# Patient Record
Sex: FEMALE | Race: WHITE
[De-identification: names, ages, dates, MRNs, and addresses within clinical notes are randomized per-mention and may not be internally consistent; named-entity substitution may affect disease eponyms.]

---

## 2019-10-24 ENCOUNTER — HOSPITAL ENCOUNTER (EMERGENCY)
Dept: HOSPITAL 53 - M ED | Age: 79
Discharge: HOME | End: 2019-10-24
Payer: MEDICARE

## 2019-10-24 VITALS — DIASTOLIC BLOOD PRESSURE: 74 MMHG | SYSTOLIC BLOOD PRESSURE: 170 MMHG

## 2019-10-24 VITALS — WEIGHT: 230.49 LBS | BODY MASS INDEX: 42.42 KG/M2 | HEIGHT: 62 IN

## 2019-10-24 DIAGNOSIS — S20.212A: ICD-10-CM

## 2019-10-24 DIAGNOSIS — Z79.890: ICD-10-CM

## 2019-10-24 DIAGNOSIS — S01.81XA: Primary | ICD-10-CM

## 2019-10-24 DIAGNOSIS — I10: ICD-10-CM

## 2019-10-24 DIAGNOSIS — E11.9: ICD-10-CM
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
## 2019-10-24 DIAGNOSIS — Z79.899: ICD-10-CM
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
## 2019-10-24 LAB
ALBUMIN SERPL BCG-MCNC: 2.8 GM/DL (ref 3.2–5.2)
ALT SERPL W P-5'-P-CCNC: 22 U/L (ref 12–78)
APTT BLD: 35.9 SECONDS (ref 25–38.4)
BASOPHILS # BLD AUTO: 0 10^3/UL (ref 0–0.2)
BASOPHILS NFR BLD AUTO: 0.6 % (ref 0–1)
BILIRUB CONJ SERPL-MCNC: 0.3 MG/DL (ref 0–0.2)
BILIRUB SERPL-MCNC: 0.9 MG/DL (ref 0.2–1)
BUN SERPL-MCNC: 21 MG/DL (ref 7–18)
CALCIUM SERPL-MCNC: 9.2 MG/DL (ref 8.8–10.2)
CHLORIDE SERPL-SCNC: 107 MEQ/L (ref 98–107)
CO2 SERPL-SCNC: 30 MEQ/L (ref 21–32)
CREAT SERPL-MCNC: 1.19 MG/DL (ref 0.55–1.3)
EOSINOPHIL # BLD AUTO: 0.4 10^3/UL (ref 0–0.5)
EOSINOPHIL NFR BLD AUTO: 5.4 % (ref 0–3)
GFR SERPL CREATININE-BSD FRML MDRD: 46.7 ML/MIN/{1.73_M2} (ref 39–?)
GLUCOSE SERPL-MCNC: 277 MG/DL (ref 70–100)
HCT VFR BLD AUTO: 41.2 % (ref 36–47)
HGB BLD-MCNC: 13.3 G/DL (ref 12–15.5)
INR PPP: 1.1
LIPASE SERPL-CCNC: 692 U/L (ref 73–393)
LYMPHOCYTES # BLD AUTO: 1.8 10^3/UL (ref 1.5–5)
LYMPHOCYTES NFR BLD AUTO: 26.8 % (ref 24–44)
MCH RBC QN AUTO: 31.4 PG (ref 27–33)
MCHC RBC AUTO-ENTMCNC: 32.3 G/DL (ref 32–36.5)
MCV RBC AUTO: 97.2 FL (ref 80–96)
MONOCYTES # BLD AUTO: 0.5 10^3/UL (ref 0–0.8)
MONOCYTES NFR BLD AUTO: 7.2 % (ref 0–5)
NEUTROPHILS # BLD AUTO: 3.9 10^3/UL (ref 1.5–8.5)
NEUTROPHILS NFR BLD AUTO: 59.4 % (ref 36–66)
PLATELET # BLD AUTO: 109 10^3/UL (ref 150–450)
POTASSIUM SERPL-SCNC: 4.9 MEQ/L (ref 3.5–5.1)
PROT SERPL-MCNC: 7.3 GM/DL (ref 6.4–8.2)
PROTHROMBIN TIME: 13.9 SECONDS (ref 11.8–14)
RBC # BLD AUTO: 4.24 10^6/UL (ref 4–5.4)
SODIUM SERPL-SCNC: 142 MEQ/L (ref 136–145)
WBC # BLD AUTO: 6.6 10^3/UL (ref 4–10)

## 2019-10-24 PROCEDURE — 85610 PROTHROMBIN TIME: CPT

## 2019-10-24 PROCEDURE — 71250 CT THORAX DX C-: CPT

## 2019-10-24 PROCEDURE — 86900 BLOOD TYPING SEROLOGIC ABO: CPT

## 2019-10-24 PROCEDURE — 72125 CT NECK SPINE W/O DYE: CPT

## 2019-10-24 PROCEDURE — 85730 THROMBOPLASTIN TIME PARTIAL: CPT

## 2019-10-24 PROCEDURE — 96374 THER/PROPH/DIAG INJ IV PUSH: CPT

## 2019-10-24 PROCEDURE — 83690 ASSAY OF LIPASE: CPT

## 2019-10-24 PROCEDURE — 12011 RPR F/E/E/N/L/M 2.5 CM/<: CPT

## 2019-10-24 PROCEDURE — 94760 N-INVAS EAR/PLS OXIMETRY 1: CPT

## 2019-10-24 PROCEDURE — 74176 CT ABD & PELVIS W/O CONTRAST: CPT

## 2019-10-24 PROCEDURE — 80048 BASIC METABOLIC PNL TOTAL CA: CPT

## 2019-10-24 PROCEDURE — 86901 BLOOD TYPING SEROLOGIC RH(D): CPT

## 2019-10-24 PROCEDURE — 80076 HEPATIC FUNCTION PANEL: CPT

## 2019-10-24 PROCEDURE — 70450 CT HEAD/BRAIN W/O DYE: CPT

## 2019-10-24 PROCEDURE — 85025 COMPLETE CBC W/AUTO DIFF WBC: CPT

## 2019-10-24 PROCEDURE — 93041 RHYTHM ECG TRACING: CPT

## 2019-10-24 PROCEDURE — 86850 RBC ANTIBODY SCREEN: CPT

## 2019-10-24 PROCEDURE — 96375 TX/PRO/DX INJ NEW DRUG ADDON: CPT

## 2019-10-24 PROCEDURE — 99285 EMERGENCY DEPT VISIT HI MDM: CPT

## 2019-10-24 NOTE — REP
CT cervical spine:  10/24/2019.

 

Indication:  Cervical spine trauma.

 

Comparison:  None.

 

Technique:  Unenhanced axial CT images of the cervical spine were performed with

sagittal and coronal reconstructions provided.

 

Findings:

 

There is no acute fracture, subluxation or dislocation.  The spinal canal and

neural foramen appear patent without evidence of spinal canal hemorrhage or

additional acute spinal canal post traumatic sequelae.  The paraspinal soft

tissues are unremarkable.  Multilevel degenerative sequelae are present most

pronounced at C6/C7.

 

Impression:

 

No acute post traumatic osseous injuries of the cervical spine.

 

 

Electronically Signed by

Robbie Li DO 10/24/2019 01:49 P

## 2019-10-24 NOTE — REP
REASON:  Pain after trauma.

 

The lack of intravenous contrast significantly decreases the sensitivity of the

exam particularly when the patient has been involved in trauma.

 

Trauma protocol requires intravenous contrast administration to better evaluate

the liver and spleen for small lacerations.

 

For discussion of the lung bases seen the CT examination of the chest report made

the same day.

 

Grossly, the liver and spleen are within normal limits.  No abnormal perisplenic

or perihepatic fluid is identified.  The pancreas, adrenal glands, and kidneys

are within normal limits for the patient's age.  The abdominal aorta and

paraaortic regions are within normal limits for the patient's age.  There is no

evidence of free fluid or free air in the abdomen.  The bowel loops and their

mesenteries are unremarkable.  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

 

CT PELVIS:

 

There is no free fluid or free air.  There is no mass or adenopathy.  The bowel

loops and their mesenteries are within normal limits.

 

Bone window technique throughout the exam shows the osseous structures to be

within normal limits for the patient's age.

 

IMPRESSION:

 

Exam limitations and findings as described above.  There is no evidence of an

acute abnormality.

 

 

Electronically Signed by

Raymond Paez DO 10/24/2019 04:57 P

## 2019-10-24 NOTE — REP
HISTORY: Pain after trauma.

 

COMPARISON: None.

 

The lack of intravenous contrast decreases the sensitivity of the exam.

 

Standard trauma protocol requires the use of intravenous contrast.

 

There is no evidence of mediastinal or hilar adenopathy. There are no pleural or

pericardial effusions. The imaged osseous structures are within normal limits for

the patient's age.

 

Evaluation of the lung fields shows no abnormal nodules, masses or opacities.

 

IMPRESSION:

 

Limited exam showing no evidence of acute disease.

 

 

Electronically Signed by

Raymond Paez DO 10/24/2019 04:57 P

## 2019-10-24 NOTE — REP
CT brain:  10/24/2019.

 

Indication:  Head trauma.

 

Comparison:  03/08/2010.

 

Technique:  Unenhanced axial CT images of the brain were obtained from skull base

to vertex.

 

Findings:

 

There is no acute intracranial hemorrhage, acute cortical infarction, mass

effect, hydrocephalus or acute calvarial fracture.  Diffuse volume loss is

present.  Patchy areas of cerebral white matter hypoattenuation are noted most

consistent with sequelae of chronic small vessel disease.

 

Impression:

 

No acute intracranial process.

 

Mild volume loss and sequelae of chronic microangiopathic ischemic disease.

 

 

Electronically Signed by

Robbie Li DO 10/24/2019 01:46 P

## 2021-04-07 ENCOUNTER — HOSPITAL ENCOUNTER (EMERGENCY)
Dept: HOSPITAL 53 - M ED | Age: 81
Discharge: HOME | End: 2021-04-07
Payer: MEDICARE

## 2021-04-07 VITALS — DIASTOLIC BLOOD PRESSURE: 91 MMHG | SYSTOLIC BLOOD PRESSURE: 121 MMHG

## 2021-04-07 VITALS — HEIGHT: 62 IN | WEIGHT: 232.81 LBS | BODY MASS INDEX: 42.84 KG/M2

## 2021-04-07 DIAGNOSIS — E11.9: Primary | ICD-10-CM

## 2021-04-07 DIAGNOSIS — E78.5: ICD-10-CM

## 2021-04-07 DIAGNOSIS — I10: ICD-10-CM

## 2021-04-07 DIAGNOSIS — Z79.899: ICD-10-CM

## 2021-04-07 DIAGNOSIS — F41.9: ICD-10-CM

## 2021-04-07 DIAGNOSIS — E66.9: ICD-10-CM

## 2021-04-07 DIAGNOSIS — Z79.4: ICD-10-CM

## 2021-04-07 LAB
BASE EXCESS BLDV CALC-SCNC: -3.2 MMOL/L (ref -2–2)
BASOPHILS # BLD AUTO: 0 10^3/UL (ref 0–0.2)
BASOPHILS NFR BLD AUTO: 0.7 % (ref 0–1)
BUN SERPL-MCNC: 34 MG/DL (ref 7–18)
CALCIUM SERPL-MCNC: 10.1 MG/DL (ref 8.8–10.2)
CHLORIDE SERPL-SCNC: 110 MEQ/L (ref 98–107)
CO2 BLDV CALC-SCNC: 23.8 MEQ/L (ref 24–28)
CO2 SERPL-SCNC: 30 MEQ/L (ref 21–32)
CREAT SERPL-MCNC: 1.66 MG/DL (ref 0.55–1.3)
EOSINOPHIL # BLD AUTO: 0.5 10^3/UL (ref 0–0.5)
EOSINOPHIL NFR BLD AUTO: 8.1 % (ref 0–3)
EST. AVERAGE GLUCOSE BLD GHB EST-MCNC: 192 MG/DL (ref 60–110)
GFR SERPL CREATININE-BSD FRML MDRD: 31.6 ML/MIN/{1.73_M2} (ref 32–?)
GLUCOSE SERPL-MCNC: 157 MG/DL (ref 70–100)
HCO3 BLDV-SCNC: 22.5 MEQ/L (ref 23–27)
HCO3 STD BLDV-SCNC: 21 MEQ/L
HCT VFR BLD AUTO: 38.4 % (ref 36–47)
HGB BLD-MCNC: 12.3 G/DL (ref 12–15.5)
LYMPHOCYTES # BLD AUTO: 2 10^3/UL (ref 1.5–5)
LYMPHOCYTES NFR BLD AUTO: 35.3 % (ref 24–44)
MCH RBC QN AUTO: 31.3 PG (ref 27–33)
MCHC RBC AUTO-ENTMCNC: 32 G/DL (ref 32–36.5)
MCV RBC AUTO: 97.7 FL (ref 80–96)
MONOCYTES # BLD AUTO: 0.6 10^3/UL (ref 0–0.8)
MONOCYTES NFR BLD AUTO: 11.2 % (ref 2–8)
NEUTROPHILS # BLD AUTO: 2.5 10^3/UL (ref 1.5–8.5)
NEUTROPHILS NFR BLD AUTO: 44.3 % (ref 36–66)
PCO2 BLDV: 42.9 MMHG (ref 38–50)
PH BLDV: 7.34 UNITS (ref 7.33–7.43)
PLATELET # BLD AUTO: 85 10^3/UL (ref 150–450)
PO2 BLDV: 33.9 MMHG (ref 30–50)
POTASSIUM SERPL-SCNC: 4 MEQ/L (ref 3.5–5.1)
RBC # BLD AUTO: 3.93 10^6/UL (ref 4–5.4)
SAO2 % BLDV: 59.5 % (ref 60–80)
SODIUM SERPL-SCNC: 142 MEQ/L (ref 136–145)
WBC # BLD AUTO: 5.6 10^3/UL (ref 4–10)

## 2021-04-07 NOTE — REP
INDICATION:

TIA.



COMPARISON:

CT head 10/24/2019.



TECHNIQUE:

Axial CT images with multiplanar reformations.



FINDINGS:

No acute bleed or acute large vessel territorial infarct.  Ventricles, cisterns and

sulci are within normal limits.  No mass effect or midline shift.  No abnormal fluid

collections.



Paranasal sinuses and mastoid air cells are clear.



IMPRESSION:

No acute findings.  No significant changes from previous.





<Electronically signed by Fausto De La Cruz > 04/07/21 [id number]

## 2021-08-03 ENCOUNTER — HOSPITAL ENCOUNTER (INPATIENT)
Dept: HOSPITAL 53 - M ED | Age: 81
LOS: 5 days | DRG: 871 | End: 2021-08-08
Attending: FAMILY MEDICINE | Admitting: FAMILY MEDICINE
Payer: MEDICARE

## 2021-08-03 VITALS — DIASTOLIC BLOOD PRESSURE: 70 MMHG | SYSTOLIC BLOOD PRESSURE: 161 MMHG

## 2021-08-03 VITALS — HEIGHT: 63 IN | WEIGHT: 255.96 LBS | BODY MASS INDEX: 45.35 KG/M2

## 2021-08-03 VITALS — DIASTOLIC BLOOD PRESSURE: 59 MMHG | SYSTOLIC BLOOD PRESSURE: 95 MMHG

## 2021-08-03 VITALS — DIASTOLIC BLOOD PRESSURE: 72 MMHG | SYSTOLIC BLOOD PRESSURE: 158 MMHG

## 2021-08-03 VITALS — SYSTOLIC BLOOD PRESSURE: 112 MMHG | DIASTOLIC BLOOD PRESSURE: 95 MMHG

## 2021-08-03 VITALS — SYSTOLIC BLOOD PRESSURE: 118 MMHG | DIASTOLIC BLOOD PRESSURE: 98 MMHG

## 2021-08-03 VITALS — SYSTOLIC BLOOD PRESSURE: 143 MMHG | DIASTOLIC BLOOD PRESSURE: 77 MMHG

## 2021-08-03 VITALS — DIASTOLIC BLOOD PRESSURE: 65 MMHG | SYSTOLIC BLOOD PRESSURE: 147 MMHG

## 2021-08-03 VITALS — SYSTOLIC BLOOD PRESSURE: 133 MMHG | DIASTOLIC BLOOD PRESSURE: 60 MMHG

## 2021-08-03 DIAGNOSIS — N39.0: ICD-10-CM

## 2021-08-03 DIAGNOSIS — Z79.4: ICD-10-CM

## 2021-08-03 DIAGNOSIS — G93.41: ICD-10-CM

## 2021-08-03 DIAGNOSIS — I76: ICD-10-CM

## 2021-08-03 DIAGNOSIS — E03.9: ICD-10-CM

## 2021-08-03 DIAGNOSIS — E87.2: ICD-10-CM

## 2021-08-03 DIAGNOSIS — Z79.899: ICD-10-CM

## 2021-08-03 DIAGNOSIS — I33.0: ICD-10-CM

## 2021-08-03 DIAGNOSIS — A41.1: Primary | ICD-10-CM

## 2021-08-03 DIAGNOSIS — Z51.5: ICD-10-CM

## 2021-08-03 DIAGNOSIS — I63.59: ICD-10-CM

## 2021-08-03 DIAGNOSIS — B96.20: ICD-10-CM

## 2021-08-03 DIAGNOSIS — Z20.822: ICD-10-CM

## 2021-08-03 DIAGNOSIS — E11.649: ICD-10-CM

## 2021-08-03 DIAGNOSIS — R68.0: ICD-10-CM

## 2021-08-03 DIAGNOSIS — E83.51: ICD-10-CM

## 2021-08-03 DIAGNOSIS — I46.9: ICD-10-CM

## 2021-08-03 DIAGNOSIS — M62.82: ICD-10-CM

## 2021-08-03 DIAGNOSIS — R65.21: ICD-10-CM

## 2021-08-03 DIAGNOSIS — E66.9: ICD-10-CM

## 2021-08-03 DIAGNOSIS — N17.9: ICD-10-CM

## 2021-08-03 DIAGNOSIS — Z66: ICD-10-CM

## 2021-08-03 DIAGNOSIS — I21.A1: ICD-10-CM

## 2021-08-03 LAB
ALBUMIN SERPL BCG-MCNC: 2.2 GM/DL (ref 3.2–5.2)
ALT SERPL W P-5'-P-CCNC: 44 U/L (ref 12–78)
B-OH-BUTYR SERPL-MCNC: 2.85 MG/DL (ref ?–2.81)
BASE EXCESS BLDV CALC-SCNC: -5.5 MMOL/L (ref -2–2)
BILIRUB CONJ SERPL-MCNC: 1.7 MG/DL (ref 0–0.2)
BILIRUB SERPL-MCNC: 2.9 MG/DL (ref 0.2–1)
BUN SERPL-MCNC: 49 MG/DL (ref 7–18)
CALCIUM SERPL-MCNC: 8 MG/DL (ref 8.8–10.2)
CHLORIDE SERPL-SCNC: 107 MEQ/L (ref 98–107)
CK MB CFR.DF SERPL CALC: 3.12
CK MB CFR.DF SERPL CALC: 3.43
CK MB SERPL-MCNC: 64.5 NG/ML (ref ?–3.6)
CK MB SERPL-MCNC: 70.6 NG/ML (ref ?–3.6)
CK SERPL-CCNC: 1879 U/L (ref 26–192)
CO2 BLDV CALC-SCNC: 21.3 MEQ/L (ref 24–28)
CO2 SERPL-SCNC: 21 MEQ/L (ref 21–32)
CREAT SERPL-MCNC: 1.71 MG/DL (ref 0.55–1.3)
EOSINOPHIL NFR BLD MANUAL: 1 % (ref 0–3)
EST. AVERAGE GLUCOSE BLD GHB EST-MCNC: 166 MG/DL (ref 60–110)
GFR SERPL CREATININE-BSD FRML MDRD: 30.6 ML/MIN/{1.73_M2} (ref 32–?)
GLUCOSE SERPL-MCNC: 101 MG/DL (ref 70–100)
HCO3 BLDV-SCNC: 20.1 MEQ/L (ref 23–27)
HCO3 STD BLDV-SCNC: 19.7 MEQ/L
HCT VFR BLD AUTO: 38.4 % (ref 36–47)
HGB BLD-MCNC: 12.3 G/DL (ref 12–15.5)
LIPASE SERPL-CCNC: 49 U/L (ref 73–393)
LYMPHOCYTES NFR BLD MANUAL: 4 % (ref 16–44)
MAGNESIUM SERPL-MCNC: 2.8 MG/DL (ref 1.8–2.4)
MCH RBC QN AUTO: 30.3 PG (ref 27–33)
MCHC RBC AUTO-ENTMCNC: 32 G/DL (ref 32–36.5)
MCV RBC AUTO: 94.6 FL (ref 80–96)
MONOCYTES NFR BLD MANUAL: 4 % (ref 0–5)
NEUTROPHILS NFR BLD MANUAL: 89 % (ref 28–66)
OSMOLALITY SERPL: 302 MOSM/KG (ref 280–301)
PCO2 BLDV: 39.8 MMHG (ref 38–50)
PH BLDV: 7.32 UNITS (ref 7.33–7.43)
PLATELET # BLD AUTO: 72 10^3/UL (ref 150–450)
PLATELET BLD QL SMEAR: (no result)
PO2 BLDV: 59.9 MMHG (ref 30–50)
POTASSIUM SERPL-SCNC: 4.1 MEQ/L (ref 3.5–5.1)
PROT SERPL-MCNC: 7 GM/DL (ref 6.4–8.2)
RBC # BLD AUTO: 4.06 10^6/UL (ref 4–5.4)
RSV RNA NPH QL NAA+PROBE: NEGATIVE
SAO2 % BLDV: 88.1 % (ref 60–80)
SODIUM SERPL-SCNC: 139 MEQ/L (ref 136–145)
TROPONIN I SERPL-MCNC: 3.62 NG/ML (ref ?–0.1)
TROPONIN I SERPL-MCNC: 3.8 NG/ML (ref ?–0.1)
VARIANT LYMPHS NFR BLD MANUAL: 1 % (ref 0–5)
WBC # BLD AUTO: 16.3 10^3/UL (ref 4–10)
WBC TOXIC VACUOLES BLD QL SMEAR: (no result)

## 2021-08-03 RX ADMIN — SODIUM CHLORIDE SCH MLS/HR: 9 INJECTION, SOLUTION INTRAVENOUS at 23:53

## 2021-08-03 RX ADMIN — DOCUSATE SODIUM SCH MG: 100 CAPSULE, LIQUID FILLED ORAL at 22:50

## 2021-08-03 RX ADMIN — DEXTROSE MONOHYDRATE SCH MLS/HR: 5 INJECTION INTRAVENOUS at 22:49

## 2021-08-03 NOTE — REP
INDICATION:

fall; left shoulder pain



COMPARISON:

None.



TECHNIQUE:

Three views left shoulder.



FINDINGS:

There is no evidence of acute fracture, dislocation, or intrinsic bone disease.Humeral

head is high riding with significant decreased subacromial space compatible with a

rotator cuff tear.  There are mild degenerative changes at the acromioclavicular joint.



IMPRESSION:

No fracture or dislocation. High riding humeral head compatible with rotator cuff tear.





<Electronically signed by Aron Rivas > 08/03/21 [id number]

## 2021-08-03 NOTE — REPVR
PROCEDURE INFORMATION: 

Exam: XR Chest 

Exam date and time: 8/3/2021 9:34 PM 

Age: 81 years old 

Clinical indication: Other: Pneumo 



TECHNIQUE: 

Imaging protocol: XR of the chest. 

Views: 1 view. 



COMPARISON: 

CR PORTABLE CHEST X-RAY 8/3/2021 2:01 PM 



FINDINGS: 

Lungs: Ovoid density projects over the mid heart region may suggest presence of 

a granuloma. Additional granulomas in the left mid lung zone. Increased 

interstitial lung markings demonstrated bilaterally. No acute infiltrates. 

Prominent contour of the right pulmonary hilum may be related to a prominent 

vascular structure although a perihilar infiltrate or mass not excluded. 

Further evaluation with nonemergent thoracic CT may be of additional benefit. 

Pleural spaces: Unremarkable. No pleural effusion. No pneumothorax. 

Heart/Mediastinum: Cardiomegaly. 

Bones/joints: Osteoporosis. The spine demonstrates moderate degenerative 

changes. 



IMPRESSION: 

1. Cardiomegaly. 

2. Increased interstitial lung markings demonstrated bilaterally. No acute 

infiltrates. 

3. Prominent contour of the right pulmonary hilum may be related to a prominent 

vascular structure although a perihilar infiltrate or mass not excluded. 

Further evaluation with nonemergent thoracic CT may be of additional benefit. 



Electronically signed by: Misael Aguiar On 08/03/2021  22:18:57 PM

## 2021-08-03 NOTE — ECGEPIP
[facility] - ED

                                       

                                       Test Date:    2021

Pat Name:     PAM TITUS          Department:   

Patient ID:   [id number]                 Room:         -

Gender:       Female                   Technician:   SHASHANK

:          1940               Requested By: CORNEL PEÑA

Order Number: [id number]     Reading MD:   Per Paulino

                                 Measurements

Intervals                              Axis          

Rate:         107                      P:            

IA:                                    QRS:          -50

QRSD:         108                      T:            -21

QT:           408                                    

QTc:          544                                    

                           Interpretive Statements

Atrial fibrillation with rapid ventricular response

Left axis deviation

Low voltage QRS

Incomplete right bundle branch block

Inferior infarct , age undetermined

ST & T wave abnormality, consider anterior ischemia

NO PRIORS FOR COMPARISON

Electronically Signed on 8-3-2021 22:01:14 EDT by Per Paulino

## 2021-08-03 NOTE — REP
INDICATION:

DKA.



COMPARISON:

10/24/2019.



TECHNIQUE:

CT cervical spine performed in the axial plane, with sagittal and coronal

reconstruction images performed.



FINDINGS:

There is no acute compression fracture or malalignment.  There is no prevertebral soft

tissue swelling.  There is mild to moderate diffuse spurring.  There is mild disc

space narrowing at C4-5 and C5-6 with a more moderate degree of disc space narrowing

at C6-7.  There is diffuse sclerosis and spurring at the posterior facet joints.

There is curvature toward the left.  There is normal cervical lordosis.  There is no

abnormal density in the spinal canal.



IMPRESSION:

No evidence of acute fracture or dislocation.Degenerative changes as above.





<Electronically signed by Aron Rivas > 08/03/21 [id number] mouth/nose/bulb syringe/suction catheter

## 2021-08-03 NOTE — HPEPDOC
[facility] Medical History & Physical


Date of Admission


Aug 3, 2021


Date of Service:  Aug 3, 2021





History and Physical


CHIEF COMPLAINT: 


Found down on ground by neighbors





HISTORY OF PRESENT ILLNESS: 


History was obtained from emergency department staff patient does remember how 

she ended up in the hospital. I'm told that she was found on the floor by her 

neighbors suspected to be on the ground for over 24 hours EMS was called and she

was brought to the ED. She was hypothermic 91 Fahrenheit and hypoglycemic blood 

sugar was 31. She was hypotensive. She had lactic acidosis of 4.1 her troponin 

was elevated to 3.8 ED contacted Dr. Jones cardiologist and discuss the case he

suspected this is demand ischemia from acute illness repeat troponin had gone 

down to 3.62. She had significant leukocytosis 16.3. Her urinalysis was positive

she suspected to have sepsis secondary to this urinary tract infection at this 

point. Patient is acutely ill she did start to get better with IV fluids her 

mentation started to improve she was awake enough to answer some basic questions

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 

me as a physician in [place] she had a brother she tells me she has no children she 

tells me she is not in any pain. She tells me she has no idea what happened out 

how she ended up here. She is to acutely sick to go over her entire past medical

history but she was able to me that she is diabetic.





Patient was lucid enough and responded appropriately enough that I was 

comfortably able to obtain consent for CODE STATUS patient was very clear and 

explicitly stated she does not wish to be resuscitated or intubated. MOLST form 

was completed presenting patient's wishes. She understands she is very sick and 

might die and she wants to be left to die peacefully if we can treat her with 

medication alone.





PAST MEDICAL/SURGICAL HISTORY:


Difficult to obtain accurately given patient's severe illness. She admitted to 

having diabetes


The rest of her medical history will be based on obtaining records from her 

primary care physician as well as based on her med rec





SOCIAL HISTORY:


Difficult to obtain accurately given patient's severe illness. She states she 

doesn't drink.





FAMILY HISTORY:


Not relevant given her age and severity of illness. 





ALLERGIES: Please see below.





REVIEW OF SYSTEMS:


Difficult to obtain accurately given patient's severe illness. He denied having 

chest pain also denied having shortness of breath denied being in pain.





HOME MEDICATIONS: Please see below. 





PHYSICAL EXAMINATION:


Constitutional: Arousable and appears very sick, elderly, and weak but she is 

able to answer basic questions she the hospital, she knows her own name and date

of birth and she knows she has a brother.


ENT: Sclera are clear. Mucosa is dry


Respiratory:  Lungs diminished breath sounds bilaterally. 


Cardiovascular: Irregular rate and rhythm


Gastrointestinal: Abdomen is soft, obese, non distended, non tender, BS present.




Musculoskeletal: No lower extremity edema. 


Neurologic: Unable to assess


Mental Status: A&O x3, very slow to respond to questions


Skin: No visible rashes





LABORATORY DATA: See below.





IMAGING: See chart 





MICROBIOLOGY: Please see below. 





ASSESSMENT/PLAN





# Septic shock: Admit to ICU. presumed to be due to underlying UTI. BP failed to

improve with IVFs alone. I asked to ED to place a central line and start 

Levofed. Continue IVFs. Leukocytosis initially 16.3 Given 1 dose Recephin in ED,

I will broaden this to IV Zosyn and Vanc and narrow down once we have UCx/BCx 

results back.  NPO tonight. Indwelling napier. monitor Ins/outs. 





# UTI: as above. IVFs. IV Vancomycin and Zosyn. UA+. Fu UCs, BCx.





# Hypothermia: due to severe illness and downtime. Bare hugger. Improved from 

91F initially to 96F. 





# MICHELLE: Cr 1.7. Acute illness, septic shock. IVFs NS. Trend BMP. Avoid 

nephrotoxins. Unknown if she has underlying CKD at this time.





# Rhabdomyolysis: due to down time on the floor. IVFs. CPK initially 2263. Trend

CPK. 





# Lactic acidosis: 4.1 initially. Due to severe illness with septic shock. 

Repeat per sepsis protocol. Treatment as above with IVFs/IV Abx. 





# Tropenemia: Case discussed with Dr Castro cardiology, doesn't think cardiac. Ok 

to admit. Thinks demand from her severe illness. Trop 3.8 went down to 3.62. 





# Presumed to be new A-fib: PCP records need to be obtained tomorrow if 

possible. Will not start her on anti-coagulation tonight given her severe 

illness and significant thrombocytopenia. Rate improved from 107 to 91 after 

fluids. Cardiac monitoring. 





# Hypoglycemia: initially 31. Receiving D50 amps PRN. 





# DM: ISS q6h while NPO. Frequent Accu-Cheks. Hypoglycemic precautions.





# Thrombocytopenia: monitor. Avoid anti-coagulation for tonight. 





# Elevated bilirubin: repeat CMP tomorrow. Liver US. 





# HTN: hypotensive on pressors. Hold all home anti-hypertensives. 





# Class 3 obesity: BMI 41. Complicates care. 





# Hypothyroidism: check TSH. IV Synthroid 1/2 home PO dose for now. 





# None essential home medications on hold for now. Can resume selectively when 

she's doing better. 





# DVT Prophyalxis SCDs/TEDs only for tonight 





Severely ill with guarded prognosis. 


Code Status DNR/DNI 


Contact Dr Bright office tomorrow to obtain her medical records/history. 


Critical care time 50 minutes. 





A Yousef 


Hospitalist





Vital Signs





Vital Signs








  Date Time  Temp Pulse Resp B/P (MAP) Pulse Ox O2 Delivery O2 Flow Rate FiO2


 


8/3/21 18:45  97  101/51 (68) 98 Room Air  


 


8/3/21 18:15   20     


 


8/3/21 17:35 95.0       











Laboratory Data


Labs 24H


Laboratory Tests 2


8/3/21 13:27: Bedside Glucose (Misc Panel) 135H


8/3/21 13:48: Bedside Glucose (Misc Panel) 113H


8/3/21 14:02: 


Blood Gas Bicarbonate Standard 19.7, Venous Blood pH 7.321L, Venous Blood 

Partial Pressure CO2 39.8, Venous Blood Partial Pressure O2 59.9H, Venous Blood 

Total Carbon Dioxide 21.3L, Venous Blood HCO3 20.1L, Venous Blood Oxygen 

Saturation 88.1H, Venous Blood Base Excess -5.5L


8/3/21 14:03: 


Neutrophils (%) (Auto) , Nucleated Red Blood Cells % (auto) 0.0, Neutrophils 

89H, Band Neutrophils 1, Lymphocytes (Manual) 4L, Monocytes (Manual) 4, 

Eosinophils (Manual) 1, Atypical Lymphocytes 1, Toxic Vacuolation 1+, Platelet 

Estimate DECREASED, Immature Platelet Fraction 5.6, Anion Gap 11, Glomerular 

Filtration Rate 30.6L, Estimated Mean Plasma Glucose 166H, Hemoglobin A1c 7.4, 

Osmolality 302H, Lactic Acid Level 4.1*H, Calcium Level 8.0L, Magnesium Level 

2.8H, Total Bilirubin 2.9H, Direct Bilirubin 1.7H, Aspartate Amino Transf 

(AST/SGOT) 159H, Alanine Aminotransferase (ALT/SGPT) 44, Alkaline Phosphatase 

84, Total Creatine Kinase 2263H, Creatine Kinase MB 70.6H, Creatine Kinase MB 

Relative Index 3.12, Troponin I 3.80*H, Total Protein 7.0, Albumin 2.2L, 

Albumin/Globulin Ratio 0.5L, Lipase 49L, B-Hydroxybutyrate 2.85H


8/3/21 14:05: 


Urine Color RICHIE, Urine Appearance TURBIDH, Urine pH 5.0, Urine Specific 

Gravity 1.015, Urine Protein 2+H, Urine Glucose (UA) NEGATIVE, Urine Ketones 

NEGATIVE, Urine Blood 3+H, Urine Nitrite NEGATIVE, Urine Bilirubin NEGATIVE, 

Urine Urobilinogen 4.0H, Urine Leukocyte Esterase 3+H, Urine WBC (Auto) TNTCH, 

Urine RBC (Auto) 9H, Urine Hyaline Casts (Auto) 0, Urine Bacteria (Auto) 2+H, 

Urine Squamous Epithelial Cells 4, Urine Granular Casts (Auto) 4, Urine Mucus 

(Auto) SMALL, Urine Sperm (Auto) 


8/3/21 14:22: 


Coronavirus (COVID-19)(PCR) NEGATIVE, Influenza Type A (RT-PCR) NEGATIVE, 

Influenza Type B (RT-PCR) NEGATIVE, Respiratory Syncytial Virus (PCR) NEGATIVE


8/3/21 14:47: Bedside Glucose (Misc Panel) 78L


8/3/21 15:46: Bedside Glucose (Misc Panel) 91


8/3/21 17:00: 


Total Creatine Kinase 1879H, Creatine Kinase MB 64.5H, Creatine Kinase MB 

Relative Index 3.43, Troponin I 3.62*H


8/3/21 17:03: Bedside Glucose (Misc Panel) 89


8/3/21 18:10: Bedside Glucose (Misc Panel) 73L


8/3/21 19:06: Bedside Glucose (Misc Panel) 61L


8/3/21 19:42: Bedside Glucose (Misc Panel) 97


8/3/21 19:52: 


CBC/BMP


Laboratory Tests


8/3/21 14:03








Microbiology





Microbiology


8/3/21 Urine Culture, Received


         Pending


8/3/21 Blood Culture, Received


         Pending


8/3/21 Blood Culture, Received


         Pending





Home Medications


Scheduled


Adalimumab (Humira Pen) 40 Mg/0.8 Ml Pen.ij.kit, 40 MG PO Q2WK


Atenolol (Atenolol) 50 Mg Tablet, 25 MG PO DAILY


Cholecalciferol (Vitamin D3) (Vitamin D3) 25 Mcg Capsule, 25 MCG PO DAILY


Cyanocobalamin (Vitamin B-12) (Vitamin B-12) 500 Mcg Tablet, 500 MCG PO DAILY


Duloxetine Hcl (Duloxetine HCl) 30 Mg Capsule.dr, 90 MG PO QHS


Insulin Aspart (Novolog Flexpen) 100 Unit/1 Ml Insuln.pen, 12 UNITS SQ BID


Insulin Degludec (Tresiba Flextouch U-200) 200 Unit/1 Ml Insuln.pen, 80 UNITS SQ

DAILY


Irbesartan (Irbesartan) 150 Mg Tablet, 150 MG PO DAILY


Levothyroxine Sodium (Levoxyl) 125 Mcg Tablet, 125 MCG PO DAILY


Rosuvastatin Calcium (Rosuvastatin Calcium) 20 Mg Tablet, 20 MG PO DAILY


Sitagliptin (Januvia) 50 Mg Tablet, 50 MG PO DAILY





Miscellaneous Medications


[Patient Comment]


   UNABLE TO VERIFY MEDICATIONS WITH PATIENT, MED LIST OBTAINED FROM [facility] 

[facility] 





Allergies


Coded Allergies:  


     No Known Drug Allergies (Verified  Allergy, Unknown, 4/7/21)











JENNY BOGGS MD               Aug 3, 2021 20:14

## 2021-08-03 NOTE — REP
INDICATION:

DKA.



COMPARISON:

05/29/2011.



TECHNIQUE:

Portable AP view of the chest with the patient semi upright.



FINDINGS:

The patient is rotated and the cardiac silhouette obscures almost the entire left

hemithorax.

There is interstitial coarsening throughout the visualized right lung that appears

slightly increased from the comparison study.

There are no focal infiltrates.  No pleural effusion.

The marcelo and mediastinum are obscured by patient positioning.





IMPRESSION:

Interstitial coarsening, somewhat increased from the comparison study.





<Electronically signed by Aron Mcintosh > 08/03/21 [id number]

## 2021-08-03 NOTE — REP
INDICATION:

DKA.



COMPARISON:

None.



TECHNIQUE:

CT brain performed in the axial plane.  Coronal reconstruction images are performed.



FINDINGS:

There is mild atrophy.  There is no midline shift or mass effect.  Gray-white

differentiation is well maintained.  There is no acute intracranial hemorrhage or

extra-axial fluid collection.  Bone window examination is unremarkable.  The

visualized mastoid air cells and paranasal sinuses are clear.  There are vascular

calcifications in the carotid siphons bilaterally.



IMPRESSION:

Mild stable chronic findings.  No acute intracranial abnormality.





<Electronically signed by Aron Rivas > 08/03/21 [id number]

## 2021-08-04 VITALS — SYSTOLIC BLOOD PRESSURE: 92 MMHG | DIASTOLIC BLOOD PRESSURE: 52 MMHG

## 2021-08-04 VITALS — DIASTOLIC BLOOD PRESSURE: 56 MMHG | SYSTOLIC BLOOD PRESSURE: 119 MMHG

## 2021-08-04 VITALS — SYSTOLIC BLOOD PRESSURE: 109 MMHG | DIASTOLIC BLOOD PRESSURE: 55 MMHG

## 2021-08-04 VITALS — DIASTOLIC BLOOD PRESSURE: 43 MMHG | SYSTOLIC BLOOD PRESSURE: 100 MMHG

## 2021-08-04 VITALS — SYSTOLIC BLOOD PRESSURE: 110 MMHG | DIASTOLIC BLOOD PRESSURE: 56 MMHG

## 2021-08-04 VITALS — DIASTOLIC BLOOD PRESSURE: 54 MMHG | SYSTOLIC BLOOD PRESSURE: 109 MMHG

## 2021-08-04 VITALS — SYSTOLIC BLOOD PRESSURE: 105 MMHG | DIASTOLIC BLOOD PRESSURE: 51 MMHG

## 2021-08-04 VITALS — DIASTOLIC BLOOD PRESSURE: 57 MMHG | SYSTOLIC BLOOD PRESSURE: 122 MMHG

## 2021-08-04 VITALS — DIASTOLIC BLOOD PRESSURE: 55 MMHG | SYSTOLIC BLOOD PRESSURE: 91 MMHG

## 2021-08-04 VITALS — SYSTOLIC BLOOD PRESSURE: 124 MMHG | DIASTOLIC BLOOD PRESSURE: 56 MMHG

## 2021-08-04 VITALS — DIASTOLIC BLOOD PRESSURE: 49 MMHG | SYSTOLIC BLOOD PRESSURE: 104 MMHG

## 2021-08-04 VITALS — SYSTOLIC BLOOD PRESSURE: 109 MMHG | DIASTOLIC BLOOD PRESSURE: 49 MMHG

## 2021-08-04 VITALS — SYSTOLIC BLOOD PRESSURE: 96 MMHG | DIASTOLIC BLOOD PRESSURE: 48 MMHG

## 2021-08-04 VITALS — DIASTOLIC BLOOD PRESSURE: 53 MMHG | SYSTOLIC BLOOD PRESSURE: 114 MMHG

## 2021-08-04 VITALS — DIASTOLIC BLOOD PRESSURE: 53 MMHG | SYSTOLIC BLOOD PRESSURE: 108 MMHG

## 2021-08-04 VITALS — SYSTOLIC BLOOD PRESSURE: 110 MMHG | DIASTOLIC BLOOD PRESSURE: 55 MMHG

## 2021-08-04 VITALS — DIASTOLIC BLOOD PRESSURE: 49 MMHG | SYSTOLIC BLOOD PRESSURE: 108 MMHG

## 2021-08-04 VITALS — SYSTOLIC BLOOD PRESSURE: 87 MMHG | DIASTOLIC BLOOD PRESSURE: 49 MMHG

## 2021-08-04 VITALS — DIASTOLIC BLOOD PRESSURE: 58 MMHG | SYSTOLIC BLOOD PRESSURE: 127 MMHG

## 2021-08-04 VITALS — DIASTOLIC BLOOD PRESSURE: 49 MMHG | SYSTOLIC BLOOD PRESSURE: 105 MMHG

## 2021-08-04 VITALS — DIASTOLIC BLOOD PRESSURE: 65 MMHG | SYSTOLIC BLOOD PRESSURE: 106 MMHG

## 2021-08-04 VITALS — DIASTOLIC BLOOD PRESSURE: 50 MMHG | SYSTOLIC BLOOD PRESSURE: 97 MMHG

## 2021-08-04 VITALS — SYSTOLIC BLOOD PRESSURE: 111 MMHG | DIASTOLIC BLOOD PRESSURE: 51 MMHG

## 2021-08-04 VITALS — DIASTOLIC BLOOD PRESSURE: 52 MMHG | SYSTOLIC BLOOD PRESSURE: 91 MMHG

## 2021-08-04 VITALS — DIASTOLIC BLOOD PRESSURE: 51 MMHG | SYSTOLIC BLOOD PRESSURE: 88 MMHG

## 2021-08-04 VITALS — DIASTOLIC BLOOD PRESSURE: 50 MMHG | SYSTOLIC BLOOD PRESSURE: 98 MMHG

## 2021-08-04 VITALS — SYSTOLIC BLOOD PRESSURE: 119 MMHG | DIASTOLIC BLOOD PRESSURE: 56 MMHG

## 2021-08-04 VITALS — SYSTOLIC BLOOD PRESSURE: 114 MMHG | DIASTOLIC BLOOD PRESSURE: 45 MMHG

## 2021-08-04 VITALS — SYSTOLIC BLOOD PRESSURE: 121 MMHG | DIASTOLIC BLOOD PRESSURE: 75 MMHG

## 2021-08-04 VITALS — DIASTOLIC BLOOD PRESSURE: 50 MMHG | SYSTOLIC BLOOD PRESSURE: 101 MMHG

## 2021-08-04 LAB
ALBUMIN SERPL BCG-MCNC: 1.8 GM/DL (ref 3.2–5.2)
ALBUMIN SERPL BCG-MCNC: 1.9 GM/DL (ref 3.2–5.2)
ALT SERPL W P-5'-P-CCNC: 47 U/L (ref 12–78)
ALT SERPL W P-5'-P-CCNC: 47 U/L (ref 12–78)
ANISOCYTOSIS BLD QL SMEAR: (no result)
BASE EXCESS BLDA CALC-SCNC: -6.1 MMOL/L (ref -2–2)
BASE EXCESS BLDA CALC-SCNC: -6.6 MMOL/L (ref -2–2)
BASE EXCESS BLDV CALC-SCNC: -12.4 MMOL/L (ref -2–2)
BDY SITE: (no result)
BILIRUB SERPL-MCNC: 1.9 MG/DL (ref 0.2–1)
BILIRUB SERPL-MCNC: 2.3 MG/DL (ref 0.2–1)
BUN SERPL-MCNC: 48 MG/DL (ref 7–18)
BUN SERPL-MCNC: 52 MG/DL (ref 7–18)
CALCIUM SERPL-MCNC: 6.7 MG/DL (ref 8.8–10.2)
CALCIUM SERPL-MCNC: 6.7 MG/DL (ref 8.8–10.2)
CHLORIDE SERPL-SCNC: 113 MEQ/L (ref 98–107)
CHLORIDE SERPL-SCNC: 113 MEQ/L (ref 98–107)
CK MB CFR.DF SERPL CALC: 2.41
CK MB SERPL-MCNC: 24.2 NG/ML (ref ?–3.6)
CK SERPL-CCNC: 1006 U/L (ref 26–192)
CK SERPL-CCNC: 1419 U/L (ref 26–192)
CO2 BLDA CALC-SCNC: 16.3 MEQ/L (ref 23–31)
CO2 BLDA CALC-SCNC: 16.7 MEQ/L (ref 23–31)
CO2 BLDV CALC-SCNC: 12.9 MEQ/L (ref 24–28)
CO2 SERPL-SCNC: 16 MEQ/L (ref 21–32)
CO2 SERPL-SCNC: 21 MEQ/L (ref 21–32)
CREAT SERPL-MCNC: 2 MG/DL (ref 0.55–1.3)
CREAT SERPL-MCNC: 2.54 MG/DL (ref 0.55–1.3)
GFR SERPL CREATININE-BSD FRML MDRD: 19.4 ML/MIN/{1.73_M2} (ref 32–?)
GFR SERPL CREATININE-BSD FRML MDRD: 25.5 ML/MIN/{1.73_M2} (ref 32–?)
GLUCOSE SERPL-MCNC: 159 MG/DL (ref 70–100)
GLUCOSE SERPL-MCNC: 80 MG/DL (ref 70–100)
HCO3 BLDA-SCNC: 15.6 MEQ/L (ref 22–26)
HCO3 BLDA-SCNC: 16 MEQ/L (ref 22–26)
HCO3 BLDV-SCNC: 12.2 MEQ/L (ref 23–27)
HCO3 STD BLDA-SCNC: 19 MEQ/L (ref 22–26)
HCO3 STD BLDA-SCNC: 19.4 MEQ/L (ref 22–26)
HCO3 STD BLDV-SCNC: 14.6 MEQ/L
HCT VFR BLD AUTO: 30.6 % (ref 36–47)
HCT VFR BLD AUTO: 33.4 % (ref 36–47)
HGB BLD-MCNC: 10.7 G/DL (ref 12–15.5)
HGB BLD-MCNC: 9.9 G/DL (ref 12–15.5)
LYMPHOCYTES NFR BLD MANUAL: 7 % (ref 16–44)
MAGNESIUM SERPL-MCNC: 2.2 MG/DL (ref 1.8–2.4)
MCH RBC QN AUTO: 30.1 PG (ref 27–33)
MCH RBC QN AUTO: 30.4 PG (ref 27–33)
MCHC RBC AUTO-ENTMCNC: 32 G/DL (ref 32–36.5)
MCHC RBC AUTO-ENTMCNC: 32.4 G/DL (ref 32–36.5)
MCV RBC AUTO: 93.8 FL (ref 80–96)
MCV RBC AUTO: 93.9 FL (ref 80–96)
MONOCYTES NFR BLD MANUAL: 5 % (ref 0–5)
MYELOBLASTS NFR BLD MANUAL: 1 % (ref 0–0)
NEUTROPHILS NFR BLD MANUAL: 85 % (ref 28–66)
OVALOCYTES BLD QL SMEAR: (no result)
PCO2 BLDA: 22.6 MMHG (ref 35–45)
PCO2 BLDA: 22.7 MMHG (ref 35–45)
PCO2 BLDV: 23.8 MMHG (ref 38–50)
PH BLDA: 7.46 UNITS (ref 7.35–7.45)
PH BLDA: 7.47 UNITS (ref 7.35–7.45)
PH BLDV: 7.33 UNITS (ref 7.33–7.43)
PLATELET # BLD AUTO: 61 10^3/UL (ref 150–450)
PLATELET # BLD AUTO: 64 10^3/UL (ref 150–450)
PLATELET BLD QL SMEAR: (no result)
PO2 BLDA: 103.3 MMHG (ref 75–100)
PO2 BLDA: 75.9 MMHG (ref 75–100)
PO2 BLDV: 124.1 MMHG (ref 30–50)
POIKILOCYTOSIS BLD QL SMEAR: (no result)
POLYCHROMASIA BLD QL SMEAR: (no result)
POTASSIUM SERPL-SCNC: 4.2 MEQ/L (ref 3.5–5.1)
POTASSIUM SERPL-SCNC: 4.3 MEQ/L (ref 3.5–5.1)
PROT SERPL-MCNC: 5.9 GM/DL (ref 6.4–8.2)
PROT SERPL-MCNC: 6.1 GM/DL (ref 6.4–8.2)
RBC # BLD AUTO: 3.26 10^6/UL (ref 4–5.4)
RBC # BLD AUTO: 3.56 10^6/UL (ref 4–5.4)
SAO2 % BLDA: 95.5 % (ref 95–99)
SAO2 % BLDA: 97.7 % (ref 95–99)
SAO2 % BLDV: 98.4 % (ref 60–80)
SODIUM SERPL-SCNC: 142 MEQ/L (ref 136–145)
SODIUM SERPL-SCNC: 143 MEQ/L (ref 136–145)
T4 FREE SERPL-MCNC: 0.91 NG/DL (ref 0.76–1.46)
TROPONIN I SERPL-MCNC: 5.88 NG/ML (ref ?–0.1)
TROPONIN I SERPL-MCNC: 6.32 NG/ML (ref ?–0.1)
TSH SERPL DL<=0.005 MIU/L-ACNC: 17.3 UIU/ML (ref 0.36–3.74)
WBC # BLD AUTO: 15 10^3/UL (ref 4–10)
WBC # BLD AUTO: 16.2 10^3/UL (ref 4–10)

## 2021-08-04 PROCEDURE — 5A1D90Z PERFORMANCE OF URINARY FILTRATION, CONTINUOUS, GREATER THAN 18 HOURS PER DAY: ICD-10-PCS | Performed by: INTERNAL MEDICINE

## 2021-08-04 PROCEDURE — 06HM33Z INSERTION OF INFUSION DEVICE INTO RIGHT FEMORAL VEIN, PERCUTANEOUS APPROACH: ICD-10-PCS | Performed by: INTERNAL MEDICINE

## 2021-08-04 RX ADMIN — DEXTROSE MONOHYDRATE SCH MLS/HR: 5 INJECTION INTRAVENOUS at 08:52

## 2021-08-04 RX ADMIN — DEXTROSE MONOHYDRATE SCH MLS/HR: 5 INJECTION INTRAVENOUS at 14:46

## 2021-08-04 RX ADMIN — LEVOTHYROXINE SODIUM ANHYDROUS SCH MCG: 100 INJECTION, POWDER, LYOPHILIZED, FOR SOLUTION INTRAVENOUS at 06:24

## 2021-08-04 RX ADMIN — DEXTROSE AND SODIUM CHLORIDE SCH MLS/HR: 5; 900 INJECTION, SOLUTION INTRAVENOUS at 12:11

## 2021-08-04 RX ADMIN — DOCUSATE SODIUM SCH MG: 100 CAPSULE, LIQUID FILLED ORAL at 21:00

## 2021-08-04 RX ADMIN — DEXTROSE MONOHYDRATE SCH MLS/HR: 50 INJECTION, SOLUTION INTRAVENOUS at 00:09

## 2021-08-04 RX ADMIN — INSULIN LISPRO SCH UNITS: 100 INJECTION, SOLUTION INTRAVENOUS; SUBCUTANEOUS at 18:00

## 2021-08-04 RX ADMIN — DEXTROSE MONOHYDRATE SCH MLS/HR: 50 INJECTION, SOLUTION INTRAVENOUS at 23:25

## 2021-08-04 RX ADMIN — INSULIN LISPRO SCH UNITS: 100 INJECTION, SOLUTION INTRAVENOUS; SUBCUTANEOUS at 05:52

## 2021-08-04 RX ADMIN — DEXTROSE MONOHYDRATE SCH MLS/HR: 5 INJECTION INTRAVENOUS at 04:18

## 2021-08-04 RX ADMIN — INSULIN LISPRO SCH UNITS: 100 INJECTION, SOLUTION INTRAVENOUS; SUBCUTANEOUS at 00:00

## 2021-08-04 RX ADMIN — DEXTROSE AND SODIUM CHLORIDE SCH MLS/HR: 5; 900 INJECTION, SOLUTION INTRAVENOUS at 14:46

## 2021-08-04 RX ADMIN — CEFTRIAXONE SCH MLS/HR: 2 INJECTION, POWDER, FOR SOLUTION INTRAMUSCULAR; INTRAVENOUS at 21:43

## 2021-08-04 RX ADMIN — CEFTRIAXONE SCH MLS/HR: 2 INJECTION, POWDER, FOR SOLUTION INTRAMUSCULAR; INTRAVENOUS at 12:11

## 2021-08-04 RX ADMIN — SODIUM CHLORIDE SCH MLS/HR: 9 INJECTION, SOLUTION INTRAVENOUS at 04:49

## 2021-08-04 RX ADMIN — DEXTROSE MONOHYDRATE SCH MLS/HR: 5 INJECTION INTRAVENOUS at 20:57

## 2021-08-04 RX ADMIN — DOCUSATE SODIUM SCH MG: 100 CAPSULE, LIQUID FILLED ORAL at 08:38

## 2021-08-04 RX ADMIN — INSULIN LISPRO SCH UNITS: 100 INJECTION, SOLUTION INTRAVENOUS; SUBCUTANEOUS at 12:00

## 2021-08-04 NOTE — IPNPDOC
Text Note


Date of Service


The patient was seen on 8/4/21.





NOTE


Subjective: 


Patient was admitted on 8/3/21 for possible septic shock. When patient arrived 

yesterday she was hypoglycemia, hypotensive, and hypothermic (91 degrees 

Fahrenheit). UA showed that patient had a UTI at admission. When patient was 

seen today she was Not alert or oriented and was unable to respond to or answer 

questions.  According to the patient's nurse patient's mental status 

deteriorated overnight and now she is only responsive to painful stimuli.





Physical exam:


General: Comfortable laying in bed, mild respiratory distress with short shallow

breaths


Psych: Patient is not alert or oriented - responds to pain.


HEENT: No visible thyromegaly or lymph node enlargement, Patient has rigidity of

her neck (no forward flexion)


Chest: Patient has a clear third heart sound best heard at the tricuspid valve 

(5th intercostal space adjacent to the sternum on the left) no other murmurs or 

heart sounds were auscultated. 


Lungs: Lung sounds are distant bilaterally.  No adventitious lung sounds are 

appreciated.


Neurology: Patient does not follow commands due to altered mental status


Extremities: Patient has normal pulses pedal and posterior tibial in both 

extremities.  Trace lower extremity edema is noted





Imaging:


Chest X-ray 8/4/21 As reported: 


IMPRESSION:


Diffuse bilateral interstitial coarsening that has increased.


No focal infiltrate or pleural effusion.


Questionable new lung nodule projected above the left hemidiaphragm, over the 

heart,


to the left of the spine as discussed above.





Abdominal CT - 8/3/21 - As reported: 


IMPRESSION:


Extremely limited examination because of patient body habitus.


The gallbladder, pancreas, aorta and left kidney could not be visualized.


The common biliary duct is dilated in a patient without cholecystectomy but 

normal


size in a post cholecystectomy patient.


The spleen is mildly enlarged.


No free fluid is identified.





Chest X-ray 8/3/21 As reported: 


IMPRESSION: 


1. Cardiomegaly. 


2. Increased interstitial lung markings demonstrated bilaterally. No acute 


infiltrates. 


3. Prominent contour of the right pulmonary hilum may be related to a prominent 


vascular structure although a perihilar infiltrate or mass not excluded. 


Further evaluation with nonemergent thoracic CT may be of additional benefit. 





Shoulder X-ray 8/3/21 As reported: 


IMPRESSION:


No fracture or dislocation. High riding humeral head compatible with rotator 

cuff tear.





Head CT 8/3/21 - As reported:


IMPRESSION:


Mild stable chronic findings.  No acute intracranial abnormality.





Chest X-ray 8/3/21 - As reported:


IMPRESSION:


Interstitial coarsening, somewhat increased from the comparison study.





Cervical C-spine 8/3/21 As reported:


IMPRESSION:


No evidence of acute fracture or dislocation. Degenerative changes as above.





Assessment: 81 year old female with past medical history that includes 

hypothyroidism, Diabetes, hypertension, and rheumatoid arthritis. Patient was 

found on the floor of her bathroom and on evaluation in the emergency room was 

found to be hypotensive, hypoglycemia, and hypothermic. Patient was found to 

have a urinalysis suggestive of UTI and positive blood cultures for gram-

positive cocci in clusters, currently being treated for bacteremic septic shock 

possibly secondary to UTI versus meningitis.





Plan:


Septic shock -bacteremic, source may be urinary versus CNS


- Patient's hypotension is being controlled with fluids. Patient has received 

over 5L of fluids since admission.


- Lactic acidosis: 4.1 initially, trended down to 2.6


-General surgery consulted for placement of central line.  Dr. Driscoll felt 

central line placement was not urgent as the patient has been fluid responsive. 

Considering her low urine output, we are concerned that she will be fluid 

overloaded at some point and require discontinuation of IV fluids.  If she 

becomes hypotensive at that point she will emergently need central line plac

ement for administration of pressors.


- Blood cultures show Gram positive cocci in clusters


- Patient was initially being treated on Zosyn and vancomycin. Changing 

antibiotics to Vancomycin, Ceftriaxone, and ampicillin (day #1) to cover for 

possible meningitis due to the patient's encephalopathy and stiff neck. 





MICHELLE with oliguria: 


- Patient creatinine has increased since admission from 1.71 -> 2.00, BUN was 49

on admission and 48 today. 


- ATN - could have been caused by hypotension, Rhabdomyolysis, or infection. 


- Since admission the patient has only made 55ml of urine overnight despite re

ceiving over 5L of fluids since admission


- We will continue to monitor Creatinine and BUN.  


- Nephrology consulted, appreciate recommendations





Rhabdomyolysis: 


- Elevated CK was found on admission and it has trended down- (2263 -> 1419)


- We will continue to monitor labs and continue with IV fluid resuscitation





Possible Meningitis:


- Patient has bacteremia, nuchal rigidity, and Altered mental status on physical

examination.


- Antibiotic coverage as above





A. fib with RVR:


New presumably new onset, developed overnight after admission


Patient is status post IV digoxin, currently rate controlled


We will avoid anticoagulation at this point given her thrombocytopenia





Elevated troponin (3.62 on admission -> 5.88 this morning), potentially related 

to demand ischemia, rule out ACS


- EKG was ordered at admission and today in the morning - No new ischemic 

changes were seen on EKG. 


- Echocardiogram was ordered and results are pending, called and asked Dr. Castro 

to read this stat.





Elevated liver enzymes- most likely from prolonged hypotension 


- Patient has elevated AST and bilirubin. We will continue to monitor labs daily





Tachypnea 


- Most likely caused by compensatory response to metabolic acidosis from lactic 

acidosis 


- CXR was negative for any lung pathology, although does show some early signs 

of fluid congestion likely from IV fluid administration.





Hypoglycemia


-Monitor FSBS every 6 hours


Added D5 to IV fluids


Hypoglycemic protocol





Hypothyroidism


- Patient had elevated TSH on admission, likely sick euthyroid syndrome


-T4 level normal, can consider follow-up TSH in the outpatient setting if 

patient recovers





DVT Prophylaxis: 


- Patient is thrombocytopenic (62K this morning) , holding anticoagulants for 

right now.


- teds and SCDs





CODE STATUS: DNR/DNI


Patient initially indicated on admission that she wants to be DNR/DNI when she 

was felt to be in a lucid state to make her own decisions. When talking to the 

patient's stated health proxy this morning as the patient was nonresponsive, 

this CODE STATUS has been confirmed.





Disposition: Prognosis is currently guarded as the patient is critically ill.  

We will continue to discuss the care plan moving forward with the family.





GME ATTESTATION


My faculty preceptor for this patient encounter was physically present during 

the encounter and was fully available. All aspects of the patient interview, 

examination, medical decision making process, and medical care plan development 

were reviewed and approved by the faculty preceptor. The faculty preceptor is 

aware and concurs with the plan as stated in the body of this note and will 

attest to such by his/her cosignature.





ATTENDING NOTE


I personally examined Ms. Carlisle together with the housestaff team and we d

iscussed her findings and management per organ system as accurately noted above.

I agree with the above documentation. Briefly, Ms. Carlisle is critically ill 

with septic shock that was eventually responsive to aggressive fluids, anuric 

multifactorial renal failure 2/2 prolonged hypotension/prerenal hypoperfusion, 

rhabdo, sepsis and possible obstructive component with mild hydro, as well as 

metabolic encephalopathy w/ c/f meningitis. We switched her abx to 

vanc/ceftriaxone/amp given potential meningitis and low likelihood of 

pseudomonas given low contact with healthcare settings prior. We have consulted 

nephrology, updated family about her guarded prognosis and continue fluids, 

antibiotics and appropriate electrolyte repletions as noted above.





VS,Fishbone, I+O


VS, Fishbone, I+O


Laboratory Tests


8/3/21 14:03








8/4/21 04:44











Vital Signs








  Date Time  Temp Pulse Resp B/P (MAP) Pulse Ox O2 Delivery O2 Flow Rate FiO2


 


8/4/21 05:30  84  108/49 (68) 99 Room Air  


 


8/4/21 04:00 99.5  32     














I&O- Last 24 Hours up to 6 AM 


 


 8/4/21





 05:59


 


Intake Total 5150 ml


 


Output Total 55 ml


 


Balance 5095 ml

















AKIKO BROOKS OMS-3        Aug 4, 2021 09:32


GUSTABO GARCIA D.O.         Aug 4, 2021 19:05


SHELLY CHOE MD    Aug 5, 2021 08:03

## 2021-08-04 NOTE — REP
INDICATION:

resp distress.



COMPARISON:

Portable chest dated 08/03/2021.



TECHNIQUE:

Portable AP chest with the patient upright.



FINDINGS:

There is diffuse bilateral interstitial coarsening that appears to have increased from

the comparison study.

There is no focal infiltrate.  No pleural effusions are identified.

Cardiac size is upper normal for portable positioning.

There is a small nodule like density projected over the heart inferiorly to the left

of the spine of uncertain significance, possibly a lung nodule.  There is no lung

nodule in this location on the most recent chest CT dated 10/24/2019.





IMPRESSION:

Diffuse bilateral interstitial coarsening that has increased.

No focal infiltrate or pleural effusion.

Questionable new lung nodule projected above the left hemidiaphragm, over the heart,

to the left of the spine as discussed above.





<Electronically signed by Aron Mcintosh > 08/04/21 102

## 2021-08-04 NOTE — REP
INDICATION:

Anuric MICHELLE.



COMPARISON:

Whole-body abdominal ultrasound performed earlier today.



TECHNIQUE:

Multiple ultrasonographic images of the kidneys.



FINDINGS:

The study is extremely limited because of patient body habitus, edema and performed

portably.  Additionally, the patient is not alert and unable to suspend respirations

or roll and turn and required.



The right kidney measures 10.9 x 4.7 x 5.7 cm.

The left kidney measures 9.4 x 4.7 x 5.7 cm.

The kidneys are in the low normal size range.

Renal cortical echogenicity is normal bilaterally.

Mild hydronephrosis is suspected on the right..

The left kidney is extremely difficult to visualize.  No definite hydronephrosis is

identified on the left.

A 6 mm calculus, nonobstructive, is identified in the left renal upper pole.

No definite renal masses or cysts are identified, however, the kidneys are poorly

visualized.

Bladder:

The bladder could not be visualized.



IMPRESSION:

Very limited study as discussed above.  Question mild hydronephrosis on the right.

Small left renal upper pole nonobstructive calculus.





<Electronically signed by Aron Mcintosh > 08/04/21 [id number]

## 2021-08-04 NOTE — CCN
CRITICAL CARE NOTE



DATE:  08/03/2021



START TIME: 2045

STOP TIME: 2130



SUBJECTIVE: I attended Taylor Carlisle here in the Intensive Care Unit. Patient

has been examined and chart reviewed. I spoke at length with Dr. Recio. In

essence, this is an elderly female found down at home probably for at least 24

hours. She was found on the floor by her neighbors. She was initially quite

hypothermic and hypoglycemic. She was markedly hypotensive with a lactic acid

of 4, troponin of 3.8. The case was discussed with higher level of care and

they felt she was not a candidate for intervention from a cardiac standpoint.

She was initially given 3 liters of fluid but pressures remained soft. Central

lines were attempted by the ER unsuccessful. On arrival here, I attempted a

subclavian line and again was able to get a flash of venous blood but could not

get the wire to thread. She was given additional crystalloid and her blood

pressure now improved to 144 systolic. Heart rate remains in the 140s which

appears to be atrial fibrillation. She is of own volition a DNR/DNI. Currently,

she is awake, alert and interactive although globally weak. 



Most recent laboratories shows a white blood cell count of 16.3, hemoglobin

12.3, platelet count ___, 89% segs, 1% bands, sodium 139, K 4.1, chloride 107,

CO2 21, BUN 49, creatinine 1.71, glucose 101. Repeat lactic acid down to 2.9.

Troponin initially 3.8, now 3.62. Blood gas done, venous, showed a pH of 7.32,

pCO2 of 39.8. 



Chest x-ray shows no infiltrates. Urine is turbid, 3+ blood, too numerous to

count white blood cell count, 3+ leukocyte esterase with 2+ bacteria. She has

received Rocephin, Vancomycin  and now Zosyn. Levophed has been ordered but it

has not been started. She is on Synthroid as well.  



OBJECTIVE:

GENERAL: She is sedate, easily arousable and answers questions. She is very

ill-appearing.

HEENT: Pupils react, sclera clear. Trachea is in the midline.

VITAL SIGNS: As outlined above. Temperature here in the ICU is 96.8. 

CHEST: Clear to auscultation and percussion anteriorly. There are some fine

dependent crackles.

CARDIAC: Distant, irregularly irregular. Peripheral pulses are diminished but

palpable. Trace edema. She has multiple bruises consistent with her being found

at home.

ABDOMEN: Obese, soft with active bowel sounds. No organomegaly or masses. 

EXTREMITIES: No cyanosis or clubbing. 

NEUROLOGIC: She is awake and alert, does answer questions and moves all

extremities. 



Other laboratories shows CPK initially at 2263, now down to 1879.



The most pressing problems requiring my presence at the bedside:



  1.  Lactic acidosis.

  2.  Renal failure.

  3.  Hypotension, probably hypovolemia. 

  4.  Markedly elevated troponin.

  5.  Atrial fibrillation.

  6.  Hypothyroidism.

  7.  DNR/DNI status.



At this point, I am in agreement with the current antimicrobials as well as her

volume resuscitation. We were unable to get central access. For now, she has

responded nicely to volume in the crystalloids, hopefully that will continue.

Cardiology is involved in her care as well.



At this point, he prognosis is guarded but her response to this point is at

least somewhat reassuring. Will proceed as outlined above. I left the bedside

at 2130 hours; 45 minutes of critical care time was delivered at the bedside

not including procedures.

## 2021-08-04 NOTE — REP
INDICATION:

Elevated bilirubin.



COMPARISON:

Abdomen/pelvis CT dated 10/24/2019.



TECHNIQUE:

Multiple ultrasonographic images of the abdomen.



FINDINGS:

The examination is technically difficult because of patient body habitus.

The gallbladder cannot be identified.  This may be from prior cholecystectomy or

obscuration from patient body habitus.

There is no intrahepatic biliary duct dilatation.

The common biliary duct measures 6.3 mm in diameter.  This is a dilated in a patient

without cholecystectomy but can be normal in a post cholecystectomy patient.

Pancreas is obscured by bowel gas.

The spleen is slightly enlarged measuring 11.9 x 11.3 x 4.6 cm for a splenic index is

619.  Normal splenic index is less than 480.

The right kidney is normal size measuring 11.5 x 5.4 x 5.1 cm.

There is no right renal calculus, hydronephrosis, mass or cyst.

The left kidney could not be visualized.

The aorta could not be visualized.

No abdominal free fluid is identified.



IMPRESSION:

Extremely limited examination because of patient body habitus.

The gallbladder, pancreas, aorta and left kidney could not be visualized.

The common biliary duct is dilated in a patient without cholecystectomy but normal

size in a post cholecystectomy patient.

The spleen is mildly enlarged.

No free fluid is identified.





<Electronically signed by Aron Mcintosh > 08/04/21 0802

## 2021-08-04 NOTE — IPN
NEPHROLOGY PROGRESS NOTE



DATE:  08/04/2021



SUBJECTIVE: I have attended Mrs. Carlisle this evening again. I reviewed her

labs done at 4:22 p.m. which showed BUN of 52 and creatinine 2.54. CO2 down to

16, suggestive of metabolic acidosis. Her lactic acid level is slightly

improved to 2.2. The patient has been given  a large amount of IV fluids with a

total intake of about 8 liters and urine output of only 67 mL since admission.

She is positive by about almost 7.7 liters so far. She has developed

generalized edema and she is tachypneic but still has maintained her

oxygenation. She has minimal urine output. Unfortunately central line placement

was unsuccessful twice and she has not received pressors so far. I discussed

the situation with her son, Hay Carlisle over the phone and explained to

him about the potential need for dialysis. He consented over the phone, both

for dialysis catheter placement and for dialysis treatment. I explained to him

that she will be a candidate for CRRT which will be performed at her bedside. I

answered all his questions to his satisfaction. He indicated that he will be on

his way to [facility] and reach here by later tomorrow afternoon.  



At this time the patient is still not responding to any verbal questions or

tactile stimulus. She is still moaning. She has developed generalized edema and

some obvious wheezing. She is tachypneic with a respiratory rate up to 40 at

times. 



OBJECTIVE: 

PHYSICAL EXAMINATION: 

VITAL SIGNS:  Temperature is 99.5 degrees Fahrenheit and heart rate is now

80's. Blood pressure as low as 87/49 and as high as 104/49 mm of mercury during

the last few hours. 

HEENT: Head is atraumatic. 

NECK: Her neck veins are quite prominent now. 

HEART: Irregular in rhythm.  

LUNGS: Expiratory wheezing and diminished breath sounds at dependent parts.

ABDOMEN: Obese, soft and nontender and bowel sounds are hypoactive. 

EXTREMITIES: General edema on all four limbs but no cyanosis or clubbing.

NEUROLOGICAL: She remains unresponsive.



LABORATORY STUDIES: I have reviewed all her recent labs once again. BUN is 52

and creatinine 2.54. Chloride 113 and CO2 16. 



Sodium 142 and potassium 4.2. 



CPK is down to 1,006 and troponin is 6.32. Total protein 5.9 and albumin 1.8. 



We did a venous blood gas which showed a pH of 7.32, pco2 of 23.8 and pO2 124.

Bicarbonate is down to 12. 



PROBLEMS: 

1.  Oliguric acute renal failure.

2.  Septic shock.

3.  Gram positive bacteremia as her blood culture has just come back positive. 

4.  Hypervolemia and generalized anasarca. 

5.  Elevated troponin.

6.  Urinary tract infection.



The patient has septic shock with gram positive bacteremia and also has a

urinary tract infection. It remains to be seen what her urine culture shows.

Her mentation has deteriorated and she is now poorly responsive. I have

received consent for dialysis and for dialysis catheter placement from her son,

Hay Carlisle. 



CRRT is going to be started at her bedside. We are going to stop the IV fluids.

We will try fluid removal with CRRT and see how she does. At this point I would

recommend to continue broad spectrum coverage for gram positive and gram

negative bacteremia. It is possible that the patient has bacteremia due to

urinary infection, however this may just a different organism. Her acidosis is

likely to respond to CRRT, and at this point no need for IV fluids or IV sodium

bicarbonate. 



Thirty-eight minutes of critical care time spent during which no procedures

were performed. 

CABRERA

## 2021-08-04 NOTE — ECGEPIP
[facility]

                                       

                                       Test Date:    2021

Pat Name:     PAM TITUS          Department:   

Patient ID:   [id number]                 Room:         [place] [address]

Gender:       Female                   Technician:   ayanna

:          1940               Requested By: GUSTABO GARCIA D.O.

Order Number: [id number]     Reading MD:   Amberly Forman

                                 Measurements

Intervals                              Axis          

Rate:         84                       P:            

MN:                                    QRS:          -55

QRSD:         112                      T:            247

QT:           414                                    

QTc:          489                                    

                           Interpretive Statements

Accelerated Junctional rhythm

Left axis deviation   LAFB

Low voltage QRS

Incomplete right bundle branch block

Inferior infarct , age undetermined

ST & T wave abnormality, consider anterolateral ischemia

ALSO NONSPECFIC INF ST CHANGES NEW

PRIOR WITH AFIB    CONSIDER DIGOXIN TOXICITY

Electronically Signed on 2021 13:04:13 EDT by Amberly Forman

## 2021-08-05 VITALS — SYSTOLIC BLOOD PRESSURE: 106 MMHG | DIASTOLIC BLOOD PRESSURE: 63 MMHG

## 2021-08-05 VITALS — DIASTOLIC BLOOD PRESSURE: 55 MMHG | SYSTOLIC BLOOD PRESSURE: 112 MMHG

## 2021-08-05 VITALS — SYSTOLIC BLOOD PRESSURE: 128 MMHG | DIASTOLIC BLOOD PRESSURE: 60 MMHG

## 2021-08-05 VITALS — DIASTOLIC BLOOD PRESSURE: 54 MMHG | SYSTOLIC BLOOD PRESSURE: 123 MMHG

## 2021-08-05 VITALS — SYSTOLIC BLOOD PRESSURE: 109 MMHG | DIASTOLIC BLOOD PRESSURE: 59 MMHG

## 2021-08-05 VITALS — DIASTOLIC BLOOD PRESSURE: 56 MMHG | SYSTOLIC BLOOD PRESSURE: 129 MMHG

## 2021-08-05 VITALS — DIASTOLIC BLOOD PRESSURE: 49 MMHG | SYSTOLIC BLOOD PRESSURE: 101 MMHG

## 2021-08-05 VITALS — DIASTOLIC BLOOD PRESSURE: 52 MMHG | SYSTOLIC BLOOD PRESSURE: 110 MMHG

## 2021-08-05 VITALS — DIASTOLIC BLOOD PRESSURE: 58 MMHG | SYSTOLIC BLOOD PRESSURE: 115 MMHG

## 2021-08-05 VITALS — DIASTOLIC BLOOD PRESSURE: 54 MMHG | SYSTOLIC BLOOD PRESSURE: 115 MMHG

## 2021-08-05 VITALS — SYSTOLIC BLOOD PRESSURE: 123 MMHG | DIASTOLIC BLOOD PRESSURE: 59 MMHG

## 2021-08-05 VITALS — DIASTOLIC BLOOD PRESSURE: 55 MMHG | SYSTOLIC BLOOD PRESSURE: 119 MMHG

## 2021-08-05 VITALS — DIASTOLIC BLOOD PRESSURE: 56 MMHG | SYSTOLIC BLOOD PRESSURE: 98 MMHG

## 2021-08-05 VITALS — SYSTOLIC BLOOD PRESSURE: 106 MMHG | DIASTOLIC BLOOD PRESSURE: 51 MMHG

## 2021-08-05 VITALS — SYSTOLIC BLOOD PRESSURE: 96 MMHG | DIASTOLIC BLOOD PRESSURE: 54 MMHG

## 2021-08-05 VITALS — DIASTOLIC BLOOD PRESSURE: 51 MMHG | SYSTOLIC BLOOD PRESSURE: 103 MMHG

## 2021-08-05 VITALS — SYSTOLIC BLOOD PRESSURE: 125 MMHG | DIASTOLIC BLOOD PRESSURE: 58 MMHG

## 2021-08-05 VITALS — SYSTOLIC BLOOD PRESSURE: 103 MMHG | DIASTOLIC BLOOD PRESSURE: 57 MMHG

## 2021-08-05 VITALS — DIASTOLIC BLOOD PRESSURE: 53 MMHG | SYSTOLIC BLOOD PRESSURE: 113 MMHG

## 2021-08-05 VITALS — DIASTOLIC BLOOD PRESSURE: 53 MMHG | SYSTOLIC BLOOD PRESSURE: 104 MMHG

## 2021-08-05 VITALS — SYSTOLIC BLOOD PRESSURE: 117 MMHG | DIASTOLIC BLOOD PRESSURE: 63 MMHG

## 2021-08-05 VITALS — DIASTOLIC BLOOD PRESSURE: 56 MMHG | SYSTOLIC BLOOD PRESSURE: 117 MMHG

## 2021-08-05 VITALS — DIASTOLIC BLOOD PRESSURE: 54 MMHG | SYSTOLIC BLOOD PRESSURE: 124 MMHG

## 2021-08-05 VITALS — DIASTOLIC BLOOD PRESSURE: 56 MMHG | SYSTOLIC BLOOD PRESSURE: 109 MMHG

## 2021-08-05 LAB
ALBUMIN SERPL BCG-MCNC: 1.8 GM/DL (ref 3.2–5.2)
ALT SERPL W P-5'-P-CCNC: 58 U/L (ref 12–78)
BASOPHILS # BLD AUTO: 0.1 10^3/UL (ref 0–0.2)
BASOPHILS NFR BLD AUTO: 0.3 % (ref 0–1)
BILIRUB SERPL-MCNC: 1.8 MG/DL (ref 0.2–1)
BUN SERPL-MCNC: 29 MG/DL (ref 7–18)
BUN SERPL-MCNC: 37 MG/DL (ref 7–18)
BUN SERPL-MCNC: 46 MG/DL (ref 7–18)
CALCIUM SERPL-MCNC: 7.2 MG/DL (ref 8.8–10.2)
CALCIUM SERPL-MCNC: 7.8 MG/DL (ref 8.8–10.2)
CALCIUM SERPL-MCNC: 7.9 MG/DL (ref 8.8–10.2)
CHLORIDE SERPL-SCNC: 111 MEQ/L (ref 98–107)
CHLORIDE SERPL-SCNC: 112 MEQ/L (ref 98–107)
CHLORIDE SERPL-SCNC: 114 MEQ/L (ref 98–107)
CK MB CFR.DF SERPL CALC: 1.95
CK MB SERPL-MCNC: 24.8 NG/ML (ref ?–3.6)
CK SERPL-CCNC: 1271 U/L (ref 26–192)
CO2 SERPL-SCNC: 20 MEQ/L (ref 21–32)
CO2 SERPL-SCNC: 22 MEQ/L (ref 21–32)
CO2 SERPL-SCNC: 23 MEQ/L (ref 21–32)
CREAT SERPL-MCNC: 1.52 MG/DL (ref 0.55–1.3)
CREAT SERPL-MCNC: 1.73 MG/DL (ref 0.55–1.3)
CREAT SERPL-MCNC: 2.36 MG/DL (ref 0.55–1.3)
EOSINOPHIL # BLD AUTO: 0.1 10^3/UL (ref 0–0.5)
EOSINOPHIL NFR BLD AUTO: 0.7 % (ref 0–3)
GFR SERPL CREATININE-BSD FRML MDRD: 21.1 ML/MIN/{1.73_M2} (ref 32–?)
GFR SERPL CREATININE-BSD FRML MDRD: 30.2 ML/MIN/{1.73_M2} (ref 32–?)
GFR SERPL CREATININE-BSD FRML MDRD: 35 ML/MIN/{1.73_M2} (ref 32–?)
GLUCOSE SERPL-MCNC: 108 MG/DL (ref 70–100)
GLUCOSE SERPL-MCNC: 129 MG/DL (ref 70–100)
GLUCOSE SERPL-MCNC: 141 MG/DL (ref 70–100)
HCT VFR BLD AUTO: 29.8 % (ref 36–47)
HCT VFR BLD AUTO: 30.6 % (ref 36–47)
HCT VFR BLD AUTO: 31.1 % (ref 36–47)
HGB BLD-MCNC: 10.1 G/DL (ref 12–15.5)
HGB BLD-MCNC: 9.7 G/DL (ref 12–15.5)
HGB BLD-MCNC: 9.9 G/DL (ref 12–15.5)
LYMPHOCYTES # BLD AUTO: 1.5 10^3/UL (ref 1.5–5)
LYMPHOCYTES NFR BLD AUTO: 10 % (ref 24–44)
MAGNESIUM SERPL-MCNC: 2.2 MG/DL (ref 1.8–2.4)
MAGNESIUM SERPL-MCNC: 2.2 MG/DL (ref 1.8–2.4)
MAGNESIUM SERPL-MCNC: 2.3 MG/DL (ref 1.8–2.4)
MCH RBC QN AUTO: 30.1 PG (ref 27–33)
MCH RBC QN AUTO: 30.1 PG (ref 27–33)
MCH RBC QN AUTO: 30.4 PG (ref 27–33)
MCHC RBC AUTO-ENTMCNC: 32.4 G/DL (ref 32–36.5)
MCHC RBC AUTO-ENTMCNC: 32.5 G/DL (ref 32–36.5)
MCHC RBC AUTO-ENTMCNC: 32.6 G/DL (ref 32–36.5)
MCV RBC AUTO: 92.8 FL (ref 80–96)
MCV RBC AUTO: 93 FL (ref 80–96)
MCV RBC AUTO: 93.4 FL (ref 80–96)
MONOCYTES # BLD AUTO: 0.9 10^3/UL (ref 0–0.8)
MONOCYTES NFR BLD AUTO: 6 % (ref 2–8)
NEUTROPHILS # BLD AUTO: 11.8 10^3/UL (ref 1.5–8.5)
NEUTROPHILS NFR BLD AUTO: 78.2 % (ref 36–66)
PHOSPHATE SERPL-MCNC: 2.1 MG/DL (ref 2.5–4.9)
PHOSPHATE SERPL-MCNC: 2.5 MG/DL (ref 2.5–4.9)
PLATELET # BLD AUTO: 47 10^3/UL (ref 150–450)
PLATELET # BLD AUTO: 49 10^3/UL (ref 150–450)
PLATELET # BLD AUTO: 56 10^3/UL (ref 150–450)
POTASSIUM SERPL-SCNC: 3.7 MEQ/L (ref 3.5–5.1)
POTASSIUM SERPL-SCNC: 3.8 MEQ/L (ref 3.5–5.1)
POTASSIUM SERPL-SCNC: 4 MEQ/L (ref 3.5–5.1)
PROT SERPL-MCNC: 6.7 GM/DL (ref 6.4–8.2)
RBC # BLD AUTO: 3.19 10^6/UL (ref 4–5.4)
RBC # BLD AUTO: 3.29 10^6/UL (ref 4–5.4)
RBC # BLD AUTO: 3.35 10^6/UL (ref 4–5.4)
SODIUM SERPL-SCNC: 141 MEQ/L (ref 136–145)
SODIUM SERPL-SCNC: 141 MEQ/L (ref 136–145)
SODIUM SERPL-SCNC: 142 MEQ/L (ref 136–145)
TROPONIN I SERPL-MCNC: 4.78 NG/ML (ref ?–0.1)
WBC # BLD AUTO: 13 10^3/UL (ref 4–10)
WBC # BLD AUTO: 13.7 10^3/UL (ref 4–10)
WBC # BLD AUTO: 15.1 10^3/UL (ref 4–10)

## 2021-08-05 RX ADMIN — INSULIN LISPRO SCH UNITS: 100 INJECTION, SOLUTION INTRAVENOUS; SUBCUTANEOUS at 00:00

## 2021-08-05 RX ADMIN — DEXTROSE MONOHYDRATE SCH MLS/HR: 5 INJECTION INTRAVENOUS at 14:27

## 2021-08-05 RX ADMIN — CEFTRIAXONE SCH MLS/HR: 2 INJECTION, POWDER, FOR SOLUTION INTRAMUSCULAR; INTRAVENOUS at 22:46

## 2021-08-05 RX ADMIN — INSULIN LISPRO SCH UNITS: 100 INJECTION, SOLUTION INTRAVENOUS; SUBCUTANEOUS at 18:00

## 2021-08-05 RX ADMIN — DOCUSATE SODIUM SCH MG: 100 CAPSULE, LIQUID FILLED ORAL at 20:41

## 2021-08-05 RX ADMIN — INSULIN LISPRO SCH UNITS: 100 INJECTION, SOLUTION INTRAVENOUS; SUBCUTANEOUS at 06:00

## 2021-08-05 RX ADMIN — DEXTROSE MONOHYDRATE SCH MLS/HR: 5 INJECTION INTRAVENOUS at 05:32

## 2021-08-05 RX ADMIN — LEVOTHYROXINE SODIUM ANHYDROUS SCH MCG: 100 INJECTION, POWDER, LYOPHILIZED, FOR SOLUTION INTRAVENOUS at 05:33

## 2021-08-05 RX ADMIN — CEFTRIAXONE SCH MLS/HR: 2 INJECTION, POWDER, FOR SOLUTION INTRAMUSCULAR; INTRAVENOUS at 10:48

## 2021-08-05 RX ADMIN — DEXTROSE AND SODIUM CHLORIDE SCH MLS/HR: 10; .2 INJECTION, SOLUTION INTRAVENOUS at 08:05

## 2021-08-05 RX ADMIN — INSULIN LISPRO SCH UNITS: 100 INJECTION, SOLUTION INTRAVENOUS; SUBCUTANEOUS at 12:00

## 2021-08-05 RX ADMIN — DOCUSATE SODIUM SCH MG: 100 CAPSULE, LIQUID FILLED ORAL at 07:18

## 2021-08-05 RX ADMIN — DEXTROSE AND SODIUM CHLORIDE SCH MLS/HR: 10; .2 INJECTION, SOLUTION INTRAVENOUS at 09:09

## 2021-08-05 RX ADMIN — DEXTROSE AND SODIUM CHLORIDE SCH MLS/HR: 10; .2 INJECTION, SOLUTION INTRAVENOUS at 12:28

## 2021-08-05 RX ADMIN — DEXTROSE AND SODIUM CHLORIDE SCH MLS/HR: 10; .2 INJECTION, SOLUTION INTRAVENOUS at 13:34

## 2021-08-05 NOTE — CCN
NEPHROLOGY CRITICAL CARE PROGRESS NOTE



DATE:  08/05/2021



SUBJECTIVE:  Mrs. Carlisle is seen this morning on her bedside in the

intensive care unit.  She has been on continuous renal replacement therapy

(CRRT) since last evening.  She has minimal urine output.  However, her blood

pressure has remained stable. She has not required pressors.  Nursing staff

reports that she has been about 250 mL negative in her fluid balance since CRRT

has been started.  Patient remains poorly responsive.  She is moaning and

making noise without any ability to communicate.



PHYSICAL EXAMINATION:  

VITAL SIGNS:  Temperature 96 degrees Fahrenheit, heart rate 82 per minute,

respiratory rate 22 per minute, blood pressure 110/52 mmHg, oxygen saturation

96% on room air.

HEAD:  Atraumatic.

NECK:  Supple and jugular venous distention (JVD) is elevated about 12 cm above

sternal angle.

HEART SOUNDS:  Regular.

LUNGS:  Mild expiratory wheezing.  She has diminished breath sounds at the

bases.  

ABDOMEN:  Obese, soft and nontender.  Bowel sounds are normal.

EXTREMITIES:  Without any cyanosis or clubbing.  There is generalized edema on

all four limbs.

NEUROLOGIC:  She remains confused and unable to communicate.



LABORATORY DATA:

Today's labs show sodium 142, potassium 4.2, CO2 20, BUN 46, creatinine 2.36,

glucose 141, calcium 7.2.



CPK was 1271.  



WBC 15.1, hemoglobin 10.1, hematocrit 31, platelets 56,000.



PROBLEMS:

1.  Acute oliguric renal failure.  Patient still has minimal urine output.  She

remains on continuous renal replacement therapy (CRRT) and will continue with

the same.  I renewed her CRRT orders.  



2.  Metabolic acidosis.  Her acidosis has improved and we will continue with

the CRRT.  



3.  Generalized hypovolemia and edema.  She received about 8 liters of

intravenous (IV) fluid yesterday due to hypotension.  Now she has generalized

edema and we are trying to remove about 50 mL per hour with CRRT.  We are

concerned about low blood pressure,  so I do not want too aggressive fluid

removal.



4.  Septic shock.  Patient has Escherichia (E) coli in her urine culture. 

Blood cultures have also come back positive, according to the preliminary

report.  However, the final report is still pending.  She remains on

antibiotics with vancomycin and now she has been switched to ampicillin along

with ampicillin.  She is also on ceftriaxone every 12 hours 2 grams.  There was

also concern about meningitis due to altered mentation and neck stiffness.  She

remains on three antibiotics for now.  



Overall, her prognosis remains guarded.  She has a DO NOT RESUSCITATE (DNR)

status.



Thirty-three minutes of critical care time spent, during which no procedures

were performed.

## 2021-08-05 NOTE — CR
CONSULTATION

DATE: 08/04/2021



REFERRING PHYSICIAN:  JENNY BOGGS MD 



REASON FOR CONSULTATION:  Oliguric acute renal failure. 



HISTORY OF PRESENT ILLNESS:  Mrs. Carlisle is an elderly female who was

admitted to [facility] yesterday as she was found on the floor by

her neighbors. She was felt to be on the floor for about at least 24 hours. She

was found to have hypothermia, hypotension and hypoglycemia. She has multiple

issues and she is now in the Intensive Care Unit. She has no urine output and I

was called for a Nephrology consultation this morning. She is being treated for

septic shock. She has received a large volume of IV fluids due to hypotension,

however still has no urine output. 



PAST MEDICAL HISTORY: According to the records, she has a history of:

  1.  Diabetes. 

  2.  Hypertension. 

  3.  Hypothyroidism. 

  4.  Hypercholesterolemia. 



Her medication of Humira is suggestive of possible rheumatoid arthritis. The

patient herself is not able to give me any information at this point. 



The patient's family is not available at this point and her records are

reviewed. Apparently, she has not been admitted to [facility] in

the recent past. According to her prior records, she has a history of diabetes,

hypothyroidism, hypertension, hypercholesterolemia, depression, psoriatic

arthritis, urinary stress incontinence, diabetic neuropathy, and history of

rheumatic fever. 



PAST SURGICAL HISTORY: Significant for a history of:

  1.  Hysterectomy.

  2.  Cholecystectomy.

  3.  Bilateral knee replacement.

  4.  Left ankle surgery. 



MEDICATIONS: According to the records, her home medications include:

  1.  Humira 40 mg every two weeks.

  2.  Atenolol 50 mg half a tablet daily.

  3.  Vitamin D 25 mcg daily.

  4.  Vitamin B12 500 mcg daily. 

  5.  Duloxetine 30 mg at bedtime.

  6.  Novolog insulin 12 units b.i.d.

  7.  Tresiba insulin 80 units daily.

  8.  Irbesartan 150 mg daily.

  9.  Levothyroxine 125 mcg daily.

  10.Crestor 20 mg daily.

  11.Januvia 50 mg daily. 



ALLERGIES: She has no known drug allergies.



PERSONAL AND SOCIAL HISTORY: No known history for alcohol, tobacco or drug use.



FAMILY HISTORY: Noncontributory.



REVIEW OF SYSTEMS: Apparently, the patient was found on the floor by her

neighbors. She was hypothermic, hypotensive and hypoglycemic. In the hospital,

she did become responsive and was lucid enough to communicate with her

providers. She did express her wish to be a DNR and do not intubate. This

morning she has deteriorated in her mentation and now she is moaning but not

responding. She is unable to answer any questions. Her blood pressure has been

low and she has received a large volume of IV fluids for a total of about 6

liters. She still has no urine output. A central line placement was attempted

by the Emergency Room physician and by Intensive Care attending, however it was

unsuccessful. 



PHYSICAL EXAMINATION:

GENERAL: Elderly morbidly obese lady laying in the bed with eyes closed. 

VITAL SIGNS: Temperature is 99.7 degrees Fahrenheit, heart rate 84 per minute

and respiratory rate about 40 per minute, blood pressure is 97/50 mmHg and

oxygen saturation 95% on room air. 

HEENT: Head is atraumatic. Her eyes are closed. 

NECK: Neck veins are not quite prominent. 

HEART: Heart sounds are irregular in rhythm.

LUNGS: Mild expiratory wheezing with diminished breath sounds.

ABDOMEN: Obese and nontender. Bowel sounds are present.

EXTREMITIES: Without any cyanosis or clubbing.

NEUROLOGIC:  She is unresponsive. She is moaning but does not answer any

questions. 



LABORATORY DATA: On admission her WBC count was 16.3, hemoglobin 12.3 and

hematocrit 38.4, platelets 72,000. Venous blood gas on admission showed a pH of

7.32, pCO2 39.8 and pO2 of 59.9. This morning blood gas showed a pH of 7.45,

pCO2 22.6 and pO2 of 103. Bicarbonate is 19. Her admission chemistries showed

BUN 49, creatinine 1.7 and troponin 3.8. Sodium 139, potassium 4.1 and CO2 21.

Initial CPK level was 2263. A repeat lactic acid was 4.1 and troponin 3.62.

This morning BUN is 48, creatinine 2.0, lactic acid 2.6, sodium 143, potassium

4.3, troponin 5.88. CPK level 1419. A TSH level is 17.3. Urinalysis showed too

numerous to count WBCs and 9 RBCs. Digoxin level this morning is 1.4. 



PROBLEMS:

  1.  Oliguric acute renal failure, most likely this is related to prolonged

      hypotension and sepsis. She has received large volume of IV fluid,

      however she still has no urine output. Will watch for the next six hours

      and see how she does. If she does not make any urine, then we may have to

      consider dialysis. So far, she does not have any significant acidosis or

      hyperkalemia. She is oxygenating well despite large volume of IV fluid

      that she has received. 

  2.  Lactic acidosis, probably related to prolonged hypotension and sepsis. At

      this point, her lactic acidosis has slightly improved after she received

      large volume of IV fluid. At this point, there is no emergent need for

      dialysis or IV sodium bicarbonate. Will watch for the next six hours and

      then make a decision about changing IV fluid or consider dialysis. She has

      already received Vancomycin and Zosyn. I think this is appropriate

      coverage for her sepsis. 

  3.  Septic shock. Blood pressure is still somewhat low. She has received a

      large amount of fluid. Unfortunately, a central line could not be placed

      and she is not receiving any pressors so far. We will watch for the next

      few hours and see how she does. At this point there is no emergent need

      for pressors as her blood pressure has partially improved. 

  4.  Elevated troponin. Cardiology has been consulted and she is not

      considered to be a candidate for intervention at this point. 



Thank you for involving me in the care of Mrs. Carlisle. I will follow her

along with you.

## 2021-08-05 NOTE — RO
OPERATIVE NOTE



DATE OF OPERATION: 08/04/2021



PREOPERATIVE DIAGNOSIS: Oliguric acute renal failure and need for dialysis.



POSTOPERATIVE DIAGNOSIS:



PROCEDURE: Right femoral vein hemodialysis catheter placement.  



SURGEON: Andrea Connolly MD



ASSISTANT: 



ANESTHESIA: 



INDICATION:  The patient is unresponsive and unable to consent.  Telephone

consent was obtained from the patient's son, Hay Carlisle, after

explaining the need for procedure.  



DESCRIPTION OF PROCEDURE: Right femoral area was cleaned and prepped in the

usual sterile fashion.  1% lidocaine used for local anesthesia.  Right femoral

vein was accessed through the micropuncture and guidewire advanced without any

difficulty.  Small skin incision made at the site of guidewire and skin and

soft tissues dilated with skin dilator. Hemodialysis catheter then advanced

over the guidewire without any problem and good venous blood was then obtained

from all three ports. Catheter was flushed with saline and sutured in place. 

The patient tolerated the procedure well.  



Blood loss was about 10 ml.  No complications.

## 2021-08-05 NOTE — IPNPDOC
Text Note


Date of Service


The patient was seen on 8/5/21.





NOTE


Subjective: Pt was brought into the hospital with hypoglycemia, hypotension, and

hypothermia. Today when patient was seen lying supine in her bed. Patient was 

more talkative (incomprehensible speech and opened her eyes to pain) then 

yesterday GCS today was 8 eyes (2), Verbal (2), and motor (4). Patient is still 

not alert and oriented.





Review of systems: Patient was non-responsive to questions. 





Physical exam: 


General: Patient is hypothermic Body temperature was 95.9 degrees Fahrenheit 

despite warming measures. 


Psych: Patient is not alert and oriented and GCS is 8 eyes (2), Verbal (2), and 

motor (4).


HEENT: Patient has no visible thyromegaly or lymph node enlargement, but nuchal 

rigidity is still present. 


Heart: Patient as S3 heart sound best heard at tricuspid region. S1 and S2 are 

present no other heart sounds or murmurs are Auscultated. 


Lungs: Patients tachypnea is better than yesterday. Respiratory rate was 29 in 

the morning. Her breaths are deeper and much better than yesterday. 


Extremities: No edema and pulses were 2/4 BL. 


ABD: No tenderness on abdominal palpation.





Assessment: 81 year old female with past medical history that includes 

hypothyroidism, Diabetes, hypertension, and rheumatoid arthritis. Patient was 

found on the floor of her bathroom and on evaluation in the emergency room was 

found to be hypotensive, hypoglycemia, and hypothermic. Patient was found to 

have a urinalysis suggestive of UTI on admission and and positive blood cultures

for gram-positive cocci in clusters, patient is being treated for septic shock 

from UTI and possible meningitis. Patient has also developed MICHELLE from 

hypotension and rhabdomyolysis, elevated troponin's from unknown cause (presumed

from initial shock episode but no ischemic changes found on ECG), and elevated 

liver enzymes from hypotension.





Plan:


Septic shock -bacteremic, source may be urinary versus CNS


- Patient's hypotension is being controlled with fluids. Patient has received 

over 8L of fluids since admission.


- Lactic acidosis: 4.1 initially, trended down to 2.6. 


-General surgery consulted for placement of central line. Dr. Driscoll felt 

central line placement was not urgent as the patient has been fluid responsive. 

Considering her low urine output, we are concerned that she will be fluid 

overloaded at some point and require discontinuation of IV fluids.  If she 

becomes hypotensive at that point she will emergently need central line 

placement for administration of pressors.


- Blood cultures show Gram positive cocci in clusters


- Patient was initially being treated on Zosyn and vancomycin. Changing 

antibiotics to Vancomycin, Ceftriaxone, and ampicillin (day #1) to cover for 

possible meningitis due to the patient's encephalopathy and nuchal rigidity. 





MICHELLE with oliguria: 


- Patient creatinine has increased since admission from 1.71 -> 2.00 -> 2.54 

(16:22-8/4/21) -> 2.36 (5:15 -8/5/21), BUN was 49 on admission and 48 -> 56 

(16:22) -> 46 (5:15 - 8/5/21). 


- ATN - could have been caused by hypotension, Rhabdomyolysis, or infection. 


- Since admission the patient has only made 120ml of urine overnight despite 

receiving over 8L of fluids since admission


- We will continue to monitor Creatinine and BUN.


- Nephrology consulted, appreciate recommendations. 





Rhabdomyolysis: 


- Elevated CK was found on admission and it has trended down, went slightly up 

from last night- (2263 -> 1419 -> 1006 (16:22 - 8/4/21) -> 1271 (5:15 - 8/5/21)


- We will continue to monitor labs and continue with IV fluid resuscitation.





Possible Meningitis:


- Patient has bacteremia, nuchal rigidity, and Altered mental status on physical

examination.


- Antibiotic coverage as above





A. fib with RVR:


New presumably new onset, developed overnight after admission


Patient is status post IV digoxin, currently rate controlled


We will avoid anticoagulation at this point given her thrombocytopenia





Elevated troponin 3.62 on admission -> 5.88 - (morning 8/4/21) -> 6.32 (16:22 - 

8/4/21) -> 4.28 (5:15 - 8/5/21), potentially related to demand ischemia, rule 

out ACS


- EKG was ordered at admission and today in the morning - No new ischemic 

changes were seen on EKG. 


- Echocardiogram shows no pathology that would lead to ischemic changes 

indicated by elevated troponin levels. 





Elevated liver enzymes- most likely from prolonged hypotension 


- Patient has elevated AST and bilirubin. We will continue to monitor labs daily





Tachypnea 


- Most likely caused by compensatory response to metabolic acidosis from lactic 

acidosis. Patient's respiratory rate was in the high 20s today which is improv

ement from yesterday when it was in the high 30s. 


- CXR was negative for any lung pathology, although does show some early signs 

of fluid congestion likely from IV fluid administration.





Hypoglycemia


-Monitor FSBS every 6 hours


Added D5 to IV fluids


Hypoglycemic protocol





Hypothyroidism


- Patient had elevated TSH on admission, likely sick euthyroid syndrome


-T4 level normal, can consider follow-up TSH in the outpatient setting if 

patient recovers





DVT Prophylaxis: 


- Patient is thrombocytopenic (56K this morning and 49K at 11:30 today), holding

anticoagulants for right now.


- teds and SCDs





CODE STATUS: DNR/DNI


Patient initially indicated on admission that she wants to be DNR/DNI when she 

was felt to be in a lucid state to make her own decisions. When talking to the 

patient's stated health proxy this morning as the patient was nonresponsive, 

this CODE STATUS has been confirmed.





Disposition: Prognosis is currently guarded as the patient is critically ill.  

We will continue to discuss the care plan moving forward with the family.





GME ATTESTATION


My faculty preceptor for this patient encounter was physically present during 

the encounter and was fully available. All aspects of the patient interview, 

examination, medical decision making process, and medical care plan development 

were reviewed and approved by the faculty preceptor. The faculty preceptor is 

aware and concurs with the plan as stated in the body of this note and will 

attest to such by his/her cosignature.





ATTENDING NOTE


I personally examined Ms. Carlisle, discussed her findings, data, clinical 

progression and plan with the housestaff team as accurately stated above. I also

updated her family in detail about the findings thus far, treatment plan and 

guarded prognosis. I also had an extensive discussion with pharmacy that had a 

discussion with ID about her antibiotic therapies and the brief synopsis of her 

clinical course is as follows:


1. She presented in septic shock, renal failure and encephalopathic 


2. She was started on aggressive IVF, empiric vanc and piptazo and pancultured


3. On day 2 AM she became obtunded with severe neck rigidity with anuria. We 

consulted nephrology and switched antibiotics at that time to vanc, ceftriaxone 

and ampicillin at a time that microbiology data was still in process, solely 

based on the clinical picture of meningisms on exam and rare contact with 

healthcare settings with low suspicion of pseudomonas.


4. Day 3 AM she had been started on CRRT by nephrology, grimacing and moving 

around with FROM of neck and confused with moaning and otherwise moving all 

extremities. During the midmorning admission BCx showed GPCs in clusters likely 

staph while UCx showed pansensitive E.coli. We have now de-escalated her to Vanc

and ceftriaxone and discontinued the ampicillin with low suspicion for listeria 

meningitis.


5. BP has now normalized to the extent that they have started taking off fluid 

with CRRT cautiously. She has also had a TTE with a pending final read. 


6. Plan at this point is closely monitor given her guarded prognosis and if she 

improves clinically to reach a stage where more imaging is possible, we will 

appropriately pursue seeding studies given her persistent bacteremia given 

positive 8/4 cultures with MRI of total spine and possible brain MRI as well. We

will also consider an LP. At this time, she is too critically ill to be pursuing

these studies and is on continuous RRT and will be continue treating with 

empiric antibiotics. The duration of antibiotics will ultimately be dictated by 

extent of infection and results of the seeding studies.





VS,Fishbone, I+O


VS, Fishbone, I+O


Laboratory Tests


8/4/21 16:22








8/5/21 05:15











Vital Signs








  Date Time  Temp Pulse Resp B/P (MAP) Pulse Ox O2 Delivery O2 Flow Rate FiO2


 


8/5/21 07:01 95.9 80 30 124/54 (77) 97 Room Air  














I&O- Last 24 Hours up to 6 AM 


 


 8/5/21





 05:59


 


Intake Total 2945 ml


 


Output Total 51 ml


 


Balance 2894 ml

















AKIKO BROOKS OMS-3        Aug 5, 2021 08:49


SHELLY CHOE MD    Aug 5, 2021 19:06


GUSTABO GARCIA D.O.         Aug 5, 2021 19:08

## 2021-08-06 VITALS — DIASTOLIC BLOOD PRESSURE: 64 MMHG | SYSTOLIC BLOOD PRESSURE: 147 MMHG

## 2021-08-06 VITALS — SYSTOLIC BLOOD PRESSURE: 115 MMHG | DIASTOLIC BLOOD PRESSURE: 53 MMHG

## 2021-08-06 VITALS — DIASTOLIC BLOOD PRESSURE: 52 MMHG | SYSTOLIC BLOOD PRESSURE: 115 MMHG

## 2021-08-06 VITALS — SYSTOLIC BLOOD PRESSURE: 133 MMHG | DIASTOLIC BLOOD PRESSURE: 60 MMHG

## 2021-08-06 VITALS — SYSTOLIC BLOOD PRESSURE: 114 MMHG | DIASTOLIC BLOOD PRESSURE: 58 MMHG

## 2021-08-06 VITALS — DIASTOLIC BLOOD PRESSURE: 65 MMHG | SYSTOLIC BLOOD PRESSURE: 151 MMHG

## 2021-08-06 VITALS — SYSTOLIC BLOOD PRESSURE: 105 MMHG | DIASTOLIC BLOOD PRESSURE: 52 MMHG

## 2021-08-06 VITALS — DIASTOLIC BLOOD PRESSURE: 65 MMHG | SYSTOLIC BLOOD PRESSURE: 150 MMHG

## 2021-08-06 VITALS — DIASTOLIC BLOOD PRESSURE: 58 MMHG | OXYGEN SATURATION: 97 % | SYSTOLIC BLOOD PRESSURE: 125 MMHG

## 2021-08-06 VITALS — SYSTOLIC BLOOD PRESSURE: 125 MMHG | DIASTOLIC BLOOD PRESSURE: 57 MMHG

## 2021-08-06 VITALS — SYSTOLIC BLOOD PRESSURE: 112 MMHG | OXYGEN SATURATION: 96 % | DIASTOLIC BLOOD PRESSURE: 57 MMHG

## 2021-08-06 VITALS — DIASTOLIC BLOOD PRESSURE: 65 MMHG | SYSTOLIC BLOOD PRESSURE: 143 MMHG

## 2021-08-06 VITALS — DIASTOLIC BLOOD PRESSURE: 59 MMHG | SYSTOLIC BLOOD PRESSURE: 121 MMHG | OXYGEN SATURATION: 95 %

## 2021-08-06 VITALS — SYSTOLIC BLOOD PRESSURE: 157 MMHG | DIASTOLIC BLOOD PRESSURE: 71 MMHG

## 2021-08-06 VITALS — SYSTOLIC BLOOD PRESSURE: 123 MMHG | DIASTOLIC BLOOD PRESSURE: 58 MMHG

## 2021-08-06 VITALS — DIASTOLIC BLOOD PRESSURE: 56 MMHG | SYSTOLIC BLOOD PRESSURE: 141 MMHG

## 2021-08-06 VITALS — SYSTOLIC BLOOD PRESSURE: 135 MMHG | DIASTOLIC BLOOD PRESSURE: 63 MMHG

## 2021-08-06 VITALS — DIASTOLIC BLOOD PRESSURE: 56 MMHG | SYSTOLIC BLOOD PRESSURE: 105 MMHG

## 2021-08-06 VITALS — SYSTOLIC BLOOD PRESSURE: 98 MMHG | DIASTOLIC BLOOD PRESSURE: 46 MMHG

## 2021-08-06 VITALS — SYSTOLIC BLOOD PRESSURE: 128 MMHG | DIASTOLIC BLOOD PRESSURE: 59 MMHG

## 2021-08-06 VITALS — SYSTOLIC BLOOD PRESSURE: 141 MMHG | DIASTOLIC BLOOD PRESSURE: 58 MMHG

## 2021-08-06 VITALS — SYSTOLIC BLOOD PRESSURE: 116 MMHG | DIASTOLIC BLOOD PRESSURE: 55 MMHG

## 2021-08-06 VITALS — DIASTOLIC BLOOD PRESSURE: 53 MMHG | SYSTOLIC BLOOD PRESSURE: 108 MMHG

## 2021-08-06 VITALS — DIASTOLIC BLOOD PRESSURE: 51 MMHG | SYSTOLIC BLOOD PRESSURE: 108 MMHG

## 2021-08-06 VITALS — SYSTOLIC BLOOD PRESSURE: 108 MMHG | DIASTOLIC BLOOD PRESSURE: 53 MMHG

## 2021-08-06 VITALS — OXYGEN SATURATION: 96 % | SYSTOLIC BLOOD PRESSURE: 113 MMHG | DIASTOLIC BLOOD PRESSURE: 54 MMHG

## 2021-08-06 LAB
ALBUMIN SERPL BCG-MCNC: 1.7 GM/DL (ref 3.2–5.2)
ALT SERPL W P-5'-P-CCNC: 56 U/L (ref 12–78)
BASOPHILS # BLD AUTO: 0 10^3/UL (ref 0–0.2)
BASOPHILS NFR BLD AUTO: 0.3 % (ref 0–1)
BILIRUB SERPL-MCNC: 1.8 MG/DL (ref 0.2–1)
BUN SERPL-MCNC: 19 MG/DL (ref 7–18)
BUN SERPL-MCNC: 23 MG/DL (ref 7–18)
BUN SERPL-MCNC: 27 MG/DL (ref 7–18)
BUN SERPL-MCNC: 29 MG/DL (ref 7–18)
CALCIUM SERPL-MCNC: 7.6 MG/DL (ref 8.8–10.2)
CALCIUM SERPL-MCNC: 7.7 MG/DL (ref 8.8–10.2)
CALCIUM SERPL-MCNC: 8 MG/DL (ref 8.8–10.2)
CALCIUM SERPL-MCNC: 8.2 MG/DL (ref 8.8–10.2)
CHLORIDE SERPL-SCNC: 109 MEQ/L (ref 98–107)
CHLORIDE SERPL-SCNC: 109 MEQ/L (ref 98–107)
CHLORIDE SERPL-SCNC: 110 MEQ/L (ref 98–107)
CHLORIDE SERPL-SCNC: 110 MEQ/L (ref 98–107)
CO2 SERPL-SCNC: 23 MEQ/L (ref 21–32)
CO2 SERPL-SCNC: 24 MEQ/L (ref 21–32)
CO2 SERPL-SCNC: 24 MEQ/L (ref 21–32)
CO2 SERPL-SCNC: 26 MEQ/L (ref 21–32)
CREAT SERPL-MCNC: 0.99 MG/DL (ref 0.55–1.3)
CREAT SERPL-MCNC: 1.1 MG/DL (ref 0.55–1.3)
CREAT SERPL-MCNC: 1.36 MG/DL (ref 0.55–1.3)
CREAT SERPL-MCNC: 1.45 MG/DL (ref 0.55–1.3)
EOSINOPHIL # BLD AUTO: 0.3 10^3/UL (ref 0–0.5)
EOSINOPHIL NFR BLD AUTO: 2.2 % (ref 0–3)
GFR SERPL CREATININE-BSD FRML MDRD: 37 ML/MIN/{1.73_M2} (ref 32–?)
GFR SERPL CREATININE-BSD FRML MDRD: 39.8 ML/MIN/{1.73_M2} (ref 32–?)
GFR SERPL CREATININE-BSD FRML MDRD: 50.9 ML/MIN/{1.73_M2} (ref 32–?)
GFR SERPL CREATININE-BSD FRML MDRD: 57.5 ML/MIN/{1.73_M2} (ref 32–?)
GLUCOSE SERPL-MCNC: 112 MG/DL (ref 70–100)
GLUCOSE SERPL-MCNC: 132 MG/DL (ref 70–100)
GLUCOSE SERPL-MCNC: 93 MG/DL (ref 70–100)
GLUCOSE SERPL-MCNC: 97 MG/DL (ref 70–100)
HCT VFR BLD AUTO: 29.2 % (ref 36–47)
HCT VFR BLD AUTO: 29.4 % (ref 36–47)
HCT VFR BLD AUTO: 29.5 % (ref 36–47)
HCT VFR BLD AUTO: 30.8 % (ref 36–47)
HGB BLD-MCNC: 10.1 G/DL (ref 12–15.5)
HGB BLD-MCNC: 9.4 G/DL (ref 12–15.5)
HGB BLD-MCNC: 9.6 G/DL (ref 12–15.5)
HGB BLD-MCNC: 9.7 G/DL (ref 12–15.5)
LYMPHOCYTES # BLD AUTO: 1.3 10^3/UL (ref 1.5–5)
LYMPHOCYTES NFR BLD AUTO: 10.8 % (ref 24–44)
MAGNESIUM SERPL-MCNC: 2.1 MG/DL (ref 1.8–2.4)
MAGNESIUM SERPL-MCNC: 2.2 MG/DL (ref 1.8–2.4)
MCH RBC QN AUTO: 30.5 PG (ref 27–33)
MCH RBC QN AUTO: 30.7 PG (ref 27–33)
MCH RBC QN AUTO: 30.7 PG (ref 27–33)
MCH RBC QN AUTO: 30.9 PG (ref 27–33)
MCHC RBC AUTO-ENTMCNC: 32.2 G/DL (ref 32–36.5)
MCHC RBC AUTO-ENTMCNC: 32.7 G/DL (ref 32–36.5)
MCHC RBC AUTO-ENTMCNC: 32.8 G/DL (ref 32–36.5)
MCHC RBC AUTO-ENTMCNC: 32.9 G/DL (ref 32–36.5)
MCV RBC AUTO: 93.6 FL (ref 80–96)
MCV RBC AUTO: 93.9 FL (ref 80–96)
MCV RBC AUTO: 93.9 FL (ref 80–96)
MCV RBC AUTO: 94.8 FL (ref 80–96)
MONOCYTES # BLD AUTO: 0.6 10^3/UL (ref 0–0.8)
MONOCYTES NFR BLD AUTO: 5 % (ref 2–8)
NEUTROPHILS # BLD AUTO: 9.1 10^3/UL (ref 1.5–8.5)
NEUTROPHILS NFR BLD AUTO: 76.8 % (ref 36–66)
PHOSPHATE SERPL-MCNC: 2.3 MG/DL (ref 2.5–4.9)
PHOSPHATE SERPL-MCNC: 2.5 MG/DL (ref 2.5–4.9)
PHOSPHATE SERPL-MCNC: 3.2 MG/DL (ref 2.5–4.9)
PHOSPHATE SERPL-MCNC: 3.9 MG/DL (ref 2.5–4.9)
PLATELET # BLD AUTO: 35 10^3/UL (ref 150–450)
PLATELET # BLD AUTO: 35 10^3/UL (ref 150–450)
PLATELET # BLD AUTO: 36 10^3/UL (ref 150–450)
PLATELET # BLD AUTO: 43 10^3/UL (ref 150–450)
POTASSIUM SERPL-SCNC: 3.9 MEQ/L (ref 3.5–5.1)
POTASSIUM SERPL-SCNC: 4 MEQ/L (ref 3.5–5.1)
PROT SERPL-MCNC: 6.1 GM/DL (ref 6.4–8.2)
RBC # BLD AUTO: 3.08 10^6/UL (ref 4–5.4)
RBC # BLD AUTO: 3.13 10^6/UL (ref 4–5.4)
RBC # BLD AUTO: 3.14 10^6/UL (ref 4–5.4)
RBC # BLD AUTO: 3.29 10^6/UL (ref 4–5.4)
SODIUM SERPL-SCNC: 139 MEQ/L (ref 136–145)
SODIUM SERPL-SCNC: 139 MEQ/L (ref 136–145)
SODIUM SERPL-SCNC: 140 MEQ/L (ref 136–145)
SODIUM SERPL-SCNC: 141 MEQ/L (ref 136–145)
WBC # BLD AUTO: 11.2 10^3/UL (ref 4–10)
WBC # BLD AUTO: 11.9 10^3/UL (ref 4–10)
WBC # BLD AUTO: 13.4 10^3/UL (ref 4–10)
WBC # BLD AUTO: 13.5 10^3/UL (ref 4–10)

## 2021-08-06 PROCEDURE — 02HV33Z INSERTION OF INFUSION DEVICE INTO SUPERIOR VENA CAVA, PERCUTANEOUS APPROACH: ICD-10-PCS | Performed by: RADIOLOGY

## 2021-08-06 RX ADMIN — HYDROMORPHONE HYDROCHLORIDE PRN MG: 1 INJECTION, SOLUTION INTRAMUSCULAR; INTRAVENOUS; SUBCUTANEOUS at 21:03

## 2021-08-06 RX ADMIN — DOCUSATE SODIUM SCH MG: 100 CAPSULE, LIQUID FILLED ORAL at 20:42

## 2021-08-06 RX ADMIN — INSULIN LISPRO SCH UNITS: 100 INJECTION, SOLUTION INTRAVENOUS; SUBCUTANEOUS at 06:00

## 2021-08-06 RX ADMIN — HYDROMORPHONE HYDROCHLORIDE PRN MG: 1 INJECTION, SOLUTION INTRAMUSCULAR; INTRAVENOUS; SUBCUTANEOUS at 02:42

## 2021-08-06 RX ADMIN — INSULIN LISPRO SCH UNITS: 100 INJECTION, SOLUTION INTRAVENOUS; SUBCUTANEOUS at 12:00

## 2021-08-06 RX ADMIN — INSULIN LISPRO SCH UNITS: 100 INJECTION, SOLUTION INTRAVENOUS; SUBCUTANEOUS at 18:00

## 2021-08-06 RX ADMIN — HYDROMORPHONE HYDROCHLORIDE PRN MG: 1 INJECTION, SOLUTION INTRAMUSCULAR; INTRAVENOUS; SUBCUTANEOUS at 11:53

## 2021-08-06 RX ADMIN — SODIUM CHLORIDE SCH ML: 9 INJECTION, SOLUTION INTRAMUSCULAR; INTRAVENOUS; SUBCUTANEOUS at 18:00

## 2021-08-06 RX ADMIN — DEXTROSE AND SODIUM CHLORIDE SCH MLS/HR: 10; .2 INJECTION, SOLUTION INTRAVENOUS at 01:13

## 2021-08-06 RX ADMIN — CEFTRIAXONE SCH MLS/HR: 2 INJECTION, POWDER, FOR SOLUTION INTRAMUSCULAR; INTRAVENOUS at 10:41

## 2021-08-06 RX ADMIN — Medication SCH UNITS: at 18:00

## 2021-08-06 RX ADMIN — INSULIN LISPRO SCH UNITS: 100 INJECTION, SOLUTION INTRAVENOUS; SUBCUTANEOUS at 00:00

## 2021-08-06 RX ADMIN — CEFTRIAXONE SCH MLS/HR: 2 INJECTION, POWDER, FOR SOLUTION INTRAMUSCULAR; INTRAVENOUS at 23:06

## 2021-08-06 RX ADMIN — LEVOTHYROXINE SODIUM ANHYDROUS SCH MCG: 100 INJECTION, POWDER, LYOPHILIZED, FOR SOLUTION INTRAVENOUS at 05:48

## 2021-08-06 RX ADMIN — DEXTROSE AND SODIUM CHLORIDE SCH MLS/HR: 10; .2 INJECTION, SOLUTION INTRAVENOUS at 02:47

## 2021-08-06 RX ADMIN — DOCUSATE SODIUM SCH MG: 100 CAPSULE, LIQUID FILLED ORAL at 07:09

## 2021-08-06 NOTE — IPNPDOC
Text Note


Date of Service


The patient was seen on 8/6/21.





NOTE


Subjective: Patient was admitted to the hospital for hypotension, hypoglycemia, 

and hypothermia after being found by neighbors on the floor. The patient was 

found to be in septic shock. When the patient was seen today she was supine in 

her bed. The patient on exam appeared to be in the same condition as yesterday. 

Patient's GCS today was 9 - Eye (3), Verbal (2), and Motor (4). 





Review of systems: Patient is not able to respond to questions about her 

condition





Physical exam: 


General: Temperature is 95.9 Fahrenheit - temperature dropped last night and her

Body temp is being maintained by bear hugger. 


Psych: Not Alert or oriented at all. GCS - 9  Eye (3), Verbal (2), and Motor (4)


HEENT: No visible thyromegaly or lymphadenopathy. Nuchal rigidity has improved 

since 1st admission but is still present.


Heart: S3 heart sound is present on auscultation but is not as loud as 1st day 

of admission. S1 and S2 heart sounds are present and no other heart sounds or 

murmurs are present. 


Lungs: Respiratory rate is high teens today. Lung sounds diminished bilaterally,

do not appreciate wheezing.


Extremities: 2/4 pedal pulse and posterior pedal pulse. 


Neuro: Patient does not follow commands. Unable to assess for weakness. Patient 

winced when big toe on both feet were pinched. 





New Imaging: None





Assessment: 81 year old female with past medical history that includes hy

pothyroidism, Diabetes, hypertension, and rheumatoid arthritis. Patient was 

found on the floor of her bathroom and on evaluation in the emergency room was 

found to be hypotensive, hypoglycemia, and hypothermic. Patient was found to 

have a urinalysis suggestive of UTI on admission (found to be positive for 

E.Coli) and and positive blood cultures for gram-positive cocci in clusters, 

patient is being treated for septic shock and possible meningitis. Patient has 

also developed MICHELLE from hypotension and rhabdomyolysis, elevated troponin's from

unknown cause (presumed from initial shock episode but no ischemic changes found

on ECG), and elevated liver enzymes from hypotension.





Plan:


Septic shock -bacteremic, source may be urinary vs CNS vs unknown


- Hypotension has stabilized. Patient has received over 8L of fluids since 

admission, now having fluid removal with dialysis. No central access was able to

be obtained.


- Lactic acidosis: 4.1 initially, trended down to 2.6. 


- PICC line placed today for better peripheral access.


- Blood cultures show Gram positive cocci in clusters - Organism was confirmed 

to be Staphylococcus lugdunensis


- Patient was initially being treated on Zosyn and vancomycin -> that was 

changed to Vancomycin, Ceftriaxone, and ampicillin to cover for possible 

meningitis due to the patient's encephalopathy and nuchal rigidity -> DC 

Ampicillin and Vancomycin today because Staphylococcus lugdunensis is found to 

be sensitive to Ceftriaxone, day #3 of antibiotics





MICHELLE with oliguria: 


- Patient creatinine has increased since admission from 1.71 -> 2.00 -> 2.54 

(16:22-8/4/21) -> 2.36 (5:15 -8/5/21) -> 1.36 (5:07 - 8/6/21) 


- BUN decreased substantially last night - was 49 on admission and 48 -> 56 

(16:22) -> 46 (5:15 - 8/5/21) -> 27 (5:07 - 8/6/21). 


- ATN - could have been caused by hypotension, Rhabdomyolysis, or infection. 


- Since admission the patient has only made 120ml of urine overnight despite 

receiving over 8L of fluids since admission. 


- We will continue to monitor BMP


- Nephrology consulted, appreciate recommendations. 


- Patient is currently on CRRT per nephrology with fluid removal as tolerated by

blood pressure





Rhabdomyolysis, resolved


- Elevated CK was found on admission and it has trended down - (2263 -> 1419 -> 

1006 (16:22 - 8/4/21) -> 1271 (5:15 - 8/5/21)





Possible Meningitis, less likely given developing clinical picture


- Patient has bacteremia, nuchal rigidity, and Altered mental status on physical

examination. Nuchal rigidity has decreased since admission but is still present.


- we have de-escalated antibiotic coverage





A. fib with RVR, resolved, currently in sinus rhythm


New presumably new onset, developed overnight after admission


Patient is status post IV digoxin, currently rate controlled


We will avoid anticoagulation at this point given her thrombocytopenia





Elevated troponin 3.62 on admission -> 5.88 - (morning 8/4/21) -> 6.32 (16:22 - 

8/4/21) -> 4.28 (5:15 - 8/5/21), potentially related to demand ischemia, rule 

out ACS


- EKGs reviewed - No new ischemic changes were seen


- Echocardiogram report reviewed





Elevated liver enzymes- most likely from prolonged hypotension, improved


- Patient has elevated AST and bilirubin. We will continue to monitor labs daily





Tachypnea, resolved


- Most likely caused by compensatory response to metabolic acidosis from lactic 

acidosis. Patient's respiratory rate was in the high 10s today which is 

improvement from yesterday when it was in the high 20s. 


- CXR was negative for any lung pathology, although does show some early signs 

of fluid congestion likely from IV fluid administration.





Hypoglycemia


-Monitor FSBS every 6 hours


Added D5 to IV fluids


Hypoglycemic protocol





Hypothyroidism


- Patient had elevated TSH on admission, likely sick euthyroid syndrome


-T4 level normal, can consider follow-up TSH in the outpatient setting if 

patient recovers





DVT Prophylaxis: 


- Patient is thrombocytopenic (37K this morning and 49K at 11:25 yesterday), 

holding anticoagulants for right now.


- teds and SCDs





CODE STATUS: DNR/DNI


Patient initially indicated on admission that she wants to be DNR/DNI when she 

was felt to be in a lucid state to make her own decisions. When talking to the 

patient's stated health proxy this morning as the patient was nonresponsive, 

this CODE STATUS has been confirmed.





Disposition: Prognosis is currently guarded as the patient is critically ill.  

We will continue to discuss the care plan moving forward with the family.





VS,Fishbone, I+O


VS, Fishbone, I+O


Laboratory Tests


8/5/21 11:29








8/5/21 17:32








8/5/21 17:33








8/6/21 00:15








8/6/21 05:07











Vital Signs








  Date Time  Temp Pulse Resp B/P (MAP) Pulse Ox O2 Delivery O2 Flow Rate FiO2


 


8/6/21 08:15 95.9 78 16  98 Room Air  


 


8/6/21 08:00    133/60 (84)    














I&O- Last 24 Hours up to 6 AM 


 


 8/6/21





 06:00


 


Intake Total 1400 ml


 


Output Total 3334 ml


 


Balance -1934 ml

















AKIKO BROOKS OMS-3        Aug 6, 2021 09:14


GUSTABO GARCIA D.O.         Aug 6, 2021 17:58

## 2021-08-06 NOTE — ECHO
ECHOCARDIOGRAM



DATE OF PROCEDURE: 08/04/2021



Age: 81



Patient location: Room 3210



REFERRING PHYSICIAN: Dr. Finesse Recio



INDICATION: Syncope



2D MEASUREMENTS:

     IVS 1.4 cm

     LV 4.4 cm

     LVPW 1.3 cm

     LA 4.7 cm

     Aorta 3.5 cm



DOPPLER MEASUREMENTS:

     Peak velocity across the aortic valve: 2.5 m/s

     Peak velocity across the LVOT 1.1 m/s

     Peak gradient across the aortic valve 26 mmHg

     Mean gradient across the aortic valve 16 mmHg

     Mitral E 1.6

     Mitral A 0.9 with a ratio of 1.9

     Maximum tricuspid valve velocity 2.5 m/s



2D COMMENTS: 

1. Normal left ventricular size with mildly increased left ventricular wall

thickness. Left ventricular systolic function is normal, estimated at 60%-65%.

2. Mildly enlarged left atrium. The right atrium also appeared to be mildly

enlarged. Normal right ventricle noted in limited views.

3. The atrial septum appeared to be normal without evidence of defect or shunt.

4. Normal aortic root.

5. No pericardial effusion seen.

6. Mildly calcified aortic valve with mildly restricted leaflet motion.

Moderately calcified mitral annulus with normal anterior mitral valve leaflet

motion. Normal tricuspid valve. The pulmonic valve and posterior pulmonary

artery branches were not well visualized.

7. The inferior vena cava was not visualized.



DOPPLER: 

It detects mild aortic regurgitation, mild mitral regurgitation, mild

tricuspid regurgitation, and mild pulmonic regurgitation. The calculated

pulmonary artery systolic pressure varies between 30-40 mmHg.  Assessment of

the left ventricular diastolic function appeared to be normal.



IMPRESSION: 

1. Normal global left ventricular systolic function with mild concentric left

ventricular hypertrophy. Assessment of the left ventricular diastolic function

appeared to be normal. That may be artifactual in view of the mitral annulus

calcification. 

2. Aortic valve sclerosis with mild aortic regurgitation and mild aortic

stenosis.

3. Mitral annulus calcification with mild mitral regurgitation and mild

calcific mitral stenosis. There was a peak gradient of 11 mmHg across the

mitral valve with a mean gradient of 5 mmHg. 

4. Mild tricuspid regurgitation with mild to moderate hypertension and dilated

right atrium. 

5. Mild pulmonic regurgitation. 

6. The study was technically limited due to poor acoustic window.

[facility]

## 2021-08-06 NOTE — CCN
NEPHROLOGY CRITICAL CARE NOTE



DATE:  08/03/2021



SUBJECTIVE:  Ms. Carlisle is seen this morning on her bedside in the

intensive care unit.  Nursing staff report that she was given pain medication

and she is currently resting.  Yesterday, she was somewhat restless, but she is

resting today much better.  Her family did come in yesterday and had a chance

to visit with her.  Apparently, they have expressed their wishes to continue

with the current care.  Patient has slightly improved urine output; however,

she is still on continuous renal replacement therapy (CRRT) due to acute renal

failure.  Her blood pressure is much better. 



PHYSICAL EXAMINATION:

VITAL SIGNS:  Temperature 97 degrees Fahrenheit, heart rate 80 per minute,

respiratory rate 16 per minute, blood pressure 108/53 mmHg, oxygen saturation

97% on room air.

INTAKE AND OUTPUT:  Records are negative by about 1400 mL. 

HEAD:  Atraumatic.

NECK:  Neck veins are still quite full and prominent.

HEART SOUNDS:  Regular.

LUNGS:  With diminished breath sounds.

ABDOMEN:  Soft and nontender.  

EXTREMITIES:  Without any cyanosis or clubbing.  She has a right femoral vein

dialysis catheter which is currently used for CRRT.  Lower extremity edema is

still 2+.



LABORATORY DATA:

Today's labs show WBC 13.4, hemoglobin 9.7, hematocrit 30%, platelets 36,000.



Sodium 141, potassium 3.9, CO2 24, BUN 23, creatinine 1.1, calcium 8.2. 



PROBLEMS:

1.  Oliguric acute renal failure.  Patient remains on continuous renal

replacement therapy (CRRT).  She has slightly improved urine output, but not

adequate to consider stopping CRRT at present.  Blood pressure is also soft, so

I will continue with CRRT and not consider switching her to intermittent

dialysis at present.



2.  Septic shock.  Blood pressure slightly improved.  She did grow Escherichia

(E) coli in her urine and Staphylococcus in her blood culture.  She remains on

ceftriaxone and vancomycin.  



3.  Generalized hypovolemia.  Patient was given a large amount of intravenous

(IV) fluids initially when she was hypotensive.  Now her blood pressure has

improved and we are trying to remove fluid with CRRT.  I am increasing the

fluid removal to 75 mL per hour as tolerated.  She is not likely to respond to

diuretics at present.



4.  Anemia.  At present, her anemia is stable and does not need any urgent

intervention.



5.  Nutrition.  Patient does not have any nutrition so far.  I would recommend

to consider nasogastric (NG) tube for feeding, if that is an option.  



Twenty-six minutes of critical care time spent during which no procedures were

performed.  Her CRRT orders are renewed.

## 2021-08-06 NOTE — REP
INDICATION:

poor access.



COMPARISON:

None.



TECHNIQUE:

The procedure was performed under the direct supervision of Dr. Rivas.



The risks and benefits of the procedure were explained and informed consent was

obtained by the healthcare proxy.



The procedure was performed in the ICU at the bedside.



The right basilic vein was localized using ultrasound guidance.  The skin was prepped

and draped in a sterile fashion.  1 mL of 1% lidocaine was used as a local anesthetic.

Using ultrasound guidance the basilic vein was cannulated and a 0.018 guidewire was

inserted.  The needle was removed and a 5 Tunisian dilator and peel-away sheath was

inserted over the guide wire.  A 5 Tunisian dual lumen catheter was cut to length of 40

cm.  The dilator was removed and the catheter was inserted over the guide wire.  A

portable chest x-ray was performed and the image demonstrates the tip of the catheter

to be in the SVC.  The peel-away sheath was removed and the catheter was flushed with

heparinized saline as per Hospital protocol.  The catheter was affixed to the skin and

a sterile dressing was applied.



Estimated blood loss: Less than 1 mL





The patient tolerated the procedure well and there were no immediate complications.



FINDINGS:

None



IMPRESSION:

PICC line insertion right basilic vein with the tip ending in the SVC.



<Electronically signed by Chad Martines > 08/06/21 1621

<Electronically signed by Aron Rivas > 08/06/21 1621

## 2021-08-07 VITALS — DIASTOLIC BLOOD PRESSURE: 67 MMHG | SYSTOLIC BLOOD PRESSURE: 159 MMHG

## 2021-08-07 VITALS — OXYGEN SATURATION: 96 % | SYSTOLIC BLOOD PRESSURE: 113 MMHG | DIASTOLIC BLOOD PRESSURE: 51 MMHG

## 2021-08-07 VITALS — DIASTOLIC BLOOD PRESSURE: 51 MMHG | SYSTOLIC BLOOD PRESSURE: 111 MMHG

## 2021-08-07 VITALS — SYSTOLIC BLOOD PRESSURE: 115 MMHG | DIASTOLIC BLOOD PRESSURE: 53 MMHG | OXYGEN SATURATION: 96 %

## 2021-08-07 VITALS — OXYGEN SATURATION: 96 % | DIASTOLIC BLOOD PRESSURE: 58 MMHG | SYSTOLIC BLOOD PRESSURE: 125 MMHG

## 2021-08-07 VITALS — SYSTOLIC BLOOD PRESSURE: 111 MMHG | OXYGEN SATURATION: 96 % | DIASTOLIC BLOOD PRESSURE: 46 MMHG

## 2021-08-07 VITALS — DIASTOLIC BLOOD PRESSURE: 63 MMHG | SYSTOLIC BLOOD PRESSURE: 138 MMHG

## 2021-08-07 VITALS — DIASTOLIC BLOOD PRESSURE: 45 MMHG | SYSTOLIC BLOOD PRESSURE: 107 MMHG | OXYGEN SATURATION: 94 %

## 2021-08-07 VITALS — OXYGEN SATURATION: 83 %

## 2021-08-07 VITALS — SYSTOLIC BLOOD PRESSURE: 157 MMHG | DIASTOLIC BLOOD PRESSURE: 56 MMHG | OXYGEN SATURATION: 95 %

## 2021-08-07 VITALS — SYSTOLIC BLOOD PRESSURE: 175 MMHG | DIASTOLIC BLOOD PRESSURE: 74 MMHG

## 2021-08-07 VITALS — DIASTOLIC BLOOD PRESSURE: 60 MMHG | SYSTOLIC BLOOD PRESSURE: 155 MMHG

## 2021-08-07 VITALS — OXYGEN SATURATION: 99 %

## 2021-08-07 VITALS — DIASTOLIC BLOOD PRESSURE: 44 MMHG | SYSTOLIC BLOOD PRESSURE: 102 MMHG

## 2021-08-07 VITALS — DIASTOLIC BLOOD PRESSURE: 58 MMHG | SYSTOLIC BLOOD PRESSURE: 128 MMHG

## 2021-08-07 VITALS — DIASTOLIC BLOOD PRESSURE: 60 MMHG | SYSTOLIC BLOOD PRESSURE: 128 MMHG

## 2021-08-07 VITALS — DIASTOLIC BLOOD PRESSURE: 53 MMHG | SYSTOLIC BLOOD PRESSURE: 114 MMHG | OXYGEN SATURATION: 96 %

## 2021-08-07 VITALS — OXYGEN SATURATION: 100 %

## 2021-08-07 VITALS — SYSTOLIC BLOOD PRESSURE: 130 MMHG | DIASTOLIC BLOOD PRESSURE: 56 MMHG

## 2021-08-07 VITALS — SYSTOLIC BLOOD PRESSURE: 131 MMHG | DIASTOLIC BLOOD PRESSURE: 52 MMHG

## 2021-08-07 VITALS — DIASTOLIC BLOOD PRESSURE: 52 MMHG | OXYGEN SATURATION: 95 % | SYSTOLIC BLOOD PRESSURE: 109 MMHG

## 2021-08-07 VITALS — SYSTOLIC BLOOD PRESSURE: 142 MMHG | DIASTOLIC BLOOD PRESSURE: 65 MMHG

## 2021-08-07 VITALS — DIASTOLIC BLOOD PRESSURE: 51 MMHG | SYSTOLIC BLOOD PRESSURE: 130 MMHG | OXYGEN SATURATION: 95 %

## 2021-08-07 LAB
ALBUMIN SERPL BCG-MCNC: 1.6 GM/DL (ref 3.2–5.2)
ALT SERPL W P-5'-P-CCNC: 49 U/L (ref 12–78)
ANISOCYTOSIS BLD QL SMEAR: (no result)
BASOPHILS NFR BLD MANUAL: 1 % (ref 0–1)
BILIRUB SERPL-MCNC: 1.8 MG/DL (ref 0.2–1)
BUN SERPL-MCNC: 18 MG/DL (ref 7–18)
BUN SERPL-MCNC: 19 MG/DL (ref 7–18)
CALCIUM SERPL-MCNC: 7.7 MG/DL (ref 8.8–10.2)
CALCIUM SERPL-MCNC: 8.2 MG/DL (ref 8.8–10.2)
CHLORIDE SERPL-SCNC: 108 MEQ/L (ref 98–107)
CHLORIDE SERPL-SCNC: 109 MEQ/L (ref 98–107)
CO2 SERPL-SCNC: 25 MEQ/L (ref 21–32)
CO2 SERPL-SCNC: 25 MEQ/L (ref 21–32)
CREAT SERPL-MCNC: 1.01 MG/DL (ref 0.55–1.3)
CREAT SERPL-MCNC: 1.04 MG/DL (ref 0.55–1.3)
EOSINOPHIL NFR BLD MANUAL: 3 % (ref 0–3)
GFR SERPL CREATININE-BSD FRML MDRD: 54.3 ML/MIN/{1.73_M2} (ref 32–?)
GFR SERPL CREATININE-BSD FRML MDRD: 56.1 ML/MIN/{1.73_M2} (ref 32–?)
GLUCOSE SERPL-MCNC: 106 MG/DL (ref 70–100)
GLUCOSE SERPL-MCNC: 94 MG/DL (ref 70–100)
HCT VFR BLD AUTO: 28.9 % (ref 36–47)
HCT VFR BLD AUTO: 29.8 % (ref 36–47)
HGB BLD-MCNC: 9.5 G/DL (ref 12–15.5)
HGB BLD-MCNC: 9.6 G/DL (ref 12–15.5)
LYMPHOCYTES NFR BLD MANUAL: 15 % (ref 16–44)
MAGNESIUM SERPL-MCNC: 2 MG/DL (ref 1.8–2.4)
MAGNESIUM SERPL-MCNC: 2.1 MG/DL (ref 1.8–2.4)
MCH RBC QN AUTO: 30.2 PG (ref 27–33)
MCH RBC QN AUTO: 31.3 PG (ref 27–33)
MCHC RBC AUTO-ENTMCNC: 31.9 G/DL (ref 32–36.5)
MCHC RBC AUTO-ENTMCNC: 33.2 G/DL (ref 32–36.5)
MCV RBC AUTO: 94.1 FL (ref 80–96)
MCV RBC AUTO: 94.6 FL (ref 80–96)
MONOCYTES NFR BLD MANUAL: 3 % (ref 0–5)
NEUTROPHILS NFR BLD MANUAL: 78 % (ref 28–66)
PHOSPHATE SERPL-MCNC: 2.8 MG/DL (ref 2.5–4.9)
PHOSPHATE SERPL-MCNC: 3.2 MG/DL (ref 2.5–4.9)
PLATELET # BLD AUTO: 31 10^3/UL (ref 150–450)
PLATELET # BLD AUTO: 33 10^3/UL (ref 150–450)
PLATELET BLD QL SMEAR: (no result)
POLYCHROMASIA BLD QL SMEAR: (no result)
POTASSIUM SERPL-SCNC: 4.2 MEQ/L (ref 3.5–5.1)
POTASSIUM SERPL-SCNC: 4.3 MEQ/L (ref 3.5–5.1)
PROT SERPL-MCNC: 6.3 GM/DL (ref 6.4–8.2)
RBC # BLD AUTO: 3.07 10^6/UL (ref 4–5.4)
RBC # BLD AUTO: 3.15 10^6/UL (ref 4–5.4)
SMUDGE CELLS BLD QL SMEAR: (no result)
SODIUM SERPL-SCNC: 138 MEQ/L (ref 136–145)
SODIUM SERPL-SCNC: 138 MEQ/L (ref 136–145)
WBC # BLD AUTO: 11.1 10^3/UL (ref 4–10)
WBC # BLD AUTO: 11.5 10^3/UL (ref 4–10)

## 2021-08-07 RX ADMIN — SODIUM CHLORIDE SCH ML: 9 INJECTION, SOLUTION INTRAMUSCULAR; INTRAVENOUS; SUBCUTANEOUS at 05:48

## 2021-08-07 RX ADMIN — INSULIN LISPRO SCH UNITS: 100 INJECTION, SOLUTION INTRAVENOUS; SUBCUTANEOUS at 19:23

## 2021-08-07 RX ADMIN — HYDROMORPHONE HYDROCHLORIDE PRN MG: 1 INJECTION, SOLUTION INTRAMUSCULAR; INTRAVENOUS; SUBCUTANEOUS at 23:46

## 2021-08-07 RX ADMIN — CEFTRIAXONE SCH MLS/HR: 2 INJECTION, POWDER, FOR SOLUTION INTRAMUSCULAR; INTRAVENOUS at 22:46

## 2021-08-07 RX ADMIN — Medication SCH UNITS: at 05:48

## 2021-08-07 RX ADMIN — INSULIN LISPRO SCH UNITS: 100 INJECTION, SOLUTION INTRAVENOUS; SUBCUTANEOUS at 00:00

## 2021-08-07 RX ADMIN — DOCUSATE SODIUM SCH MG: 100 CAPSULE, LIQUID FILLED ORAL at 20:18

## 2021-08-07 RX ADMIN — HYDROMORPHONE HYDROCHLORIDE PRN MG: 1 INJECTION, SOLUTION INTRAMUSCULAR; INTRAVENOUS; SUBCUTANEOUS at 08:08

## 2021-08-07 RX ADMIN — SODIUM CHLORIDE SCH ML: 9 INJECTION, SOLUTION INTRAMUSCULAR; INTRAVENOUS; SUBCUTANEOUS at 20:16

## 2021-08-07 RX ADMIN — HYDROMORPHONE HYDROCHLORIDE PRN MG: 1 INJECTION, SOLUTION INTRAMUSCULAR; INTRAVENOUS; SUBCUTANEOUS at 20:18

## 2021-08-07 RX ADMIN — INSULIN LISPRO SCH UNITS: 100 INJECTION, SOLUTION INTRAVENOUS; SUBCUTANEOUS at 12:00

## 2021-08-07 RX ADMIN — HYDROMORPHONE HYDROCHLORIDE PRN MG: 1 INJECTION, SOLUTION INTRAMUSCULAR; INTRAVENOUS; SUBCUTANEOUS at 03:56

## 2021-08-07 RX ADMIN — LEVOTHYROXINE SODIUM ANHYDROUS SCH MCG: 100 INJECTION, POWDER, LYOPHILIZED, FOR SOLUTION INTRAVENOUS at 05:48

## 2021-08-07 RX ADMIN — ATORVASTATIN CALCIUM SCH MG: 20 TABLET, FILM COATED ORAL at 09:00

## 2021-08-07 RX ADMIN — CEFTRIAXONE SCH MLS/HR: 2 INJECTION, POWDER, FOR SOLUTION INTRAMUSCULAR; INTRAVENOUS at 12:50

## 2021-08-07 RX ADMIN — INSULIN LISPRO SCH UNITS: 100 INJECTION, SOLUTION INTRAVENOUS; SUBCUTANEOUS at 05:47

## 2021-08-07 RX ADMIN — DOCUSATE SODIUM SCH MG: 100 CAPSULE, LIQUID FILLED ORAL at 07:53

## 2021-08-07 RX ADMIN — Medication SCH UNITS: at 18:00

## 2021-08-07 NOTE — IPN
NEPHROLOGY PROGRESS NOTE



DATE:  08/07/2021



SUBJECTIVE: Patient was seen and examined at the bedside today morning in the

ICU. She continues to be on CVVHDF. Patient was moaning. She was unable to

provide me with any review of systems. She remains oliguric. There is no

improvement in the renal function. Her urine output total is hardly 5 cc. She

is currently not requiring any pressors in view of actually able to remove

fluids during CVVHDF.



OBJECTIVE:

VITAL SIGNS: Temperature 96.6 degrees Fahrenheit, blood pressure 130/56, pulse

87, respiratory rate 16, saturating 98% on room air. 

INTAKE/OUTPUT: Urine output recorded as 160 mL the whole day yesterday.

Ultrafiltration with CVVHDF was 3,062 mL yesterday and 1,134 mL by the time I

saw her in the morning. Weight in the bed scale is 116.1 kg. 



PHYSICAL EXAMINATION:

GENERAL: Patient is lying in bed, getting CVVHDF, eyes are half closed. She is

moaning. 

HEAD/NECK: Patient's eyes are rolled up. Mucous membranes are dry. Neck is

supple. No significant JVD.

CVS: S1, S2. Patient has anasarca. She has 2+ edema of the bilateral lower and

upper extremities.

RESPIRATORY: Mildly decreased breath sounds at the bases, otherwise no active

rales or rhonchi. 

ABDOMEN: Soft, positive bowel sounds, nontender. Abdominal wall edema is noted.

GENITOURINARY: She has an indwelling Sky catheter, a small amount of urine in

the bag is noted. 

MUSCULOSKELETAL: She has significant edema and anasarca of the extremities.

CNS: Patient does not follow commands and she is moaning at this time. She is

not sedated. 



LABORATORY REVIEW: CBC showed WBC 11.1, hemoglobin 9.5, platelets 31,000. BMP

shows sodium 138, potassium 4.2, chloride 108, bicarb 25, BUN 18, creatinine

1.01. Total bilirubin 1.8. Albumin 1.6. 



MICROBIOLOGY: Blood cultures from August 4th are positive Staphylococcus

lugdunensisis and urine cultures were positive for E. Coli.



IMAGING: CT of the head was done today in the afternoon, which showed small

bilateral cerebral infarcts, which appear subacute. Later on, she was taken for

MRI of the brain, which was motion limited, there were numerous likely early

subacute ischemic infarcts involving the bilateral anterior and posterior

circulation. They can be cardiogenic emboli. MRA of the head without contrast

was also done, which showed no gross proximal large vessel occlusion.



CURRENT INPATIENT MEDICATIONS: Patient's medications were all reviewed by

myself. She continues to be on I.V. Rocephin. Pressors are on hold. I gave her

a dose of Lasix 80 mg I.V. x1 dose. No other significant change in the

medications.



ASSESSMENT AND PLAN:

  1.  Acute oliguric renal failure: Patient is on CVVHDF, however, her blood

      pressures are controlled now. CVVHDF will be stopped at the end of the

      day. I will try to diurese the patient with Lasix and if needed she will

      be given bedside intermittent hemodialysis. 

  2.  Anasarca: We are removing fluid during hemodialysis. I would start the

      patient on regular Lasix injections as well. Further fluid removal will

      be done with dialysis.

  3.  Multiple strokes on MRI: Patient most likely has septic emboli. She has

      Staph lugdunensisis bacteremia. I.V. antibiotics are being managed by the

      medical team.

  4.  Protein calorie malnutrition: Patient will get NGT placed today and will

      start tube feeds and Nepro. 



DISPOSITION: Patient overall has poor prognosis. She has sepsis secondary to

bacteremia. She is throwing septic emboli into the brain. She has multiple CVAs,

acute oliguric renal failure. Overall she has a poor prognosis and medical team

is going to discuss the course of care with the family members.



TOTAL CRITICAL CARE TIME: Spent in the management of this patient today morning

in the ICU, excluding all the procedures, was 45 minutes.

[facility]

## 2021-08-07 NOTE — REP
INDICATION:

s/p ng tube insertion.



COMPARISON:

Portable chest dated 08/04/2021.



TECHNIQUE:

Portable AP chest with the patient upright.



FINDINGS:

The lung apices are excluded at the upper film margin.

There has been interval placement of a nasogastric tube.  The nasogastric tube tip is

in satisfactory location in the abdominal left upper quadrant.

Diffuse bilateral interstitial coarsening is again noted, not significantly changed.

Cardiac size is normal.





IMPRESSION:

The NG tube tip is in the abdominal  left upper quadrant in satisfactory position.

There is diffuse bilateral interstitial coarsening, unchanged.

The lung apices are excluded at the superior film margin.





<Electronically signed by Aron Mcintosh > 08/07/21 [id number]

## 2021-08-07 NOTE — REPVR
PROCEDURE INFORMATION: 

Exam: MR Head Without Contrast 

Exam date and time: 8/7/2021 6:56 PM 

Age: 81 years old 

Clinical indication: Abnormal findings; Abnormal radiologic findings of 

head/skull; Ischemia; Additional info: Bilat cerebellar infarcts 



TECHNIQUE: 

Imaging protocol: MR of the head without contrast. 



COMPARISON: 

CT Head without contrast 8/7/2021 12:05 PM 



FINDINGS: 

 Examination is markedly motion limited. Age-related volume loss. Major 

vascular flow voids at the skull base are grossly preserved. No gross 

extra-axial fluid collection. 

 Nonspecific white matter gliosis, probable chronic microvascular ischemia. 

There are interval foci of diffusion restriction involving the bilateral 

cerebellar hemispheres, brainstem and bilateral cerebral hemispheres. Dominant 

focus at the right cerebellum measures up to 2.6 cm. There is associated T2 

prolongation. 

 Mild paranasal sinus disease. Small to moderate bilateral mastoid effusions. 



IMPRESSION: 

1. Examination is markedly motion limited. 

2. There are numerous likely early subacute ischemic infarctions involving the 

bilateral anterior and posterior circulation. Consider cardiogenic emboli. 



Electronically signed by: Kashif Bradley On 08/07/2021  19:04:32 PM

## 2021-08-07 NOTE — CR
CONSULTATION

DATE: 08/07/2021



REFERRING PHYSICIAN:  Ana Luisa Vela MD 



REASON FOR CONSULTATION:  Stroke and altered mental status.



HISTORY OF PRESENT ILLNESS:  Taylor aCrlisle is an 81-year-old woman who was

admitted at [facility] on August 3, 2021. The patient is unable

to provide any history. According to admission notes, was found on the floor by

her neighbor. The patient did not remember how she ended up in the hospital.

She was suspected to be on the floor for more than 24 hours. EMS brought her to

[facility]. She was hypothermic with temperature 91, hypoglycemic

with blood sugar 31. She was hypotensive with lactic acid 4.1 and troponin 3.8.

Her white count was high, 16.3. Urinalysis was positive for infection and she

was suspected to have sepsis due to urinary tract infection. She has been in

intensive care unit and on antibiotics. She developed renal failure requiring

CRT.  CT scan of the head initially at [facility] was

unremarkable and was also affected by motion artifact. She had a repeat CT scan

of head today which showed small left cerebellar ischemic strokes reportedly. I

reviewed the scan and suspected that she had a large stroke in left cerebellum

and it may by extending into her brainstem. I recommended stat MRI scan of

brain which was done and I reviewed it and showed large right cerebellar

multiple pontine, mid-brain, left cerebellar, multiple frontal, occipital,

parietal, deep cerebral white matter strokes bilaterally which are too numerous

to count. MRA, brain was affected by motion artifact. When I saw patient this

evening, patient is unresponsive and is unable to provide any meaningful

history.  There are no reports of fever, headaches, neck, back pain or loss of

consciousness or seizures. 



REVIEW OF SYSTEMS: Could not be obtained.



PAST MEDICAL HISTORY: Could not be obtained.



SOCIAL HISTORY: Could not be obtained.



FAMILY HISTORY: Could not be obtained.



HOME MEDICATIONS:

1.  Humira 40 mg q.2 weeks.

2.  Atenolol 25 mg p.o. daily.

3.  Vitamin B12 500 mcg p.o. daily.

4.  Cymbalta 90 mg p.o. daily.

5.  Insulin Tresiba 80 units subcutaneous daily.

6.  Irbesartan 150 mg p.o. daily.

7.  Levothyroxine 125 mcg p.o. daily.

8.  Crestor 20 mg p.o. daily.

9.  Januvia 50 mg p.o. daily.

10.  Insulin NovoLog sliding scale.



ALLERGIES: None.



PHYSICAL EXAMINATION:

VITAL SIGNS: The patient has been afebrile since admission. Current temperature

97, blood pressure 175/74, 96% saturation on room air, pulse 96.

HEART: Regular rate and rhythm.

LUNGS: Distant sounds with mild crackles. 

EXTREMITIES:  She has edema of her hands and feet. 

ABDOMEN: Soft, nontender, nondistended. 

NEUROLOGICAL: Patient is lying in bed moaning. She does not follow any

commands. Her eyes are open. She does not track any objects with her eyes. No

clear nystagmus.  Sensory, cerebellar, gait and motor and sensory could not be

performed. The patient withdraws her left leg to pain. She does not withdraw

her arms or right foot. Plantars are upgoing bilaterally. Deep tendon reflexes

could not be obtained. 



ASSESSMENT:

1.  Too numerous to count bilateral ischemic strokes affecting cerebellum,

    fifi, mid-brain, frontal, parietal, occipital lobes and deep cerebral white

    matter.

2.  Suspected septic emboli.

3.  Acute renal insufficiency requiring CRT and liver dysfunction.

4.  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 



PLAN:

1.  I discussed patient's current situation with Dr. Castillo who will

    discuss with patient's family.

2.  Her overall prognosis is guarded  poor with two many and too numerous

    strokes bilaterally.

3.  Anticoagulation will have no effect on her current state and strokes and

    will increase risk of bleeding. Her platelet count is low, 31,000 and I do

    not believe currently giving aspirin will change her outcome. 



LABORATORY STUDIES: WBC is 11.1, platelet 31,000, hemoglobin 9.5 with RBCs of

different shapes as documented in her blood smear. Her creatinine on admission

was 1.7 which went up to 2.5 and now decreased to 1.01 with CRT. Her AST was

159 on admission with normal ALT. Total bilirubin was 2.9 on admission.

## 2021-08-07 NOTE — REPVR
PROCEDURE INFORMATION: 

Exam: CT Head Without Contrast 

Exam date and time: 8/7/2021 12:07 PM 

Age: 81 years old 

Clinical indication: Altered mental status/memory loss; Additional info: AMS 



TECHNIQUE: 

Imaging protocol: Computed tomography of the head without contrast. 

Radiation optimization: All CT scans at this facility use at least one of these 

dose optimization techniques: automated exposure control; mA and/or kV 

adjustment per patient size (includes targeted exams where dose is matched to 

clinical indication); or iterative reconstruction. 



COMPARISON: 

CT Head without contrast 8/3/2021 2:35 PM 



FINDINGS: 

Brain: Small wedge-shaped areas of hypoattenuation within the bilateral 

cerebellar hemispheres, superiorly on the left and inferiorly on the right. 

Small band of hypoattenuation in the right frontal white matter. There is 

otherwise mild generalized cerebral volume loss and mild patchy white matter 

hypoattenuation, commonly secondary to chronic small vessel ischemic change. 

Mild intracranial atherosclerosis. No acute ischemic infarction. No acute 

intracranial hemorrhage. 

Cerebral ventricles: No ventriculomegaly. 

Paranasal sinuses: Visualized sinuses are unremarkable. No fluid levels. 

Mastoid air cells: Visualized mastoid air cells are well aerated. 

Bones/joints: No acute fracture. 

Soft tissues: Unremarkable. 



IMPRESSION: 

Small bilateral cerebellar infarctions, which appear either subacute or chronic 

although are new from the 10/24/2019 CT comparison. MRI could be considered for 

further evaluation. 



Electronically signed by: Anirudh Irving On 08/07/2021  12:32:38 PM

## 2021-08-07 NOTE — IPNPDOC
Text Note


Date of Service


The patient was seen on 8/7/21.





NOTE


SUBJECTIVE:


Patient is seen and examined this morning at bedside.  She remains lethargic and

is responsive only to painful stimuli.  She has remained hypothermic overnight 

requiring the bear hugger to maintain her temperature.  She continues on CRRT 

this morning.





OBJECTIVE:


VITAL SIGNS: See below


GENERAL: Lying in bed, unresponsive to verbal stimuli.  Does respond to painful 

stimuli.  She does moan out at times which does not appear to be directed.


HEENT: Normocephalic, atraumatic. she does open her eyes and gaze left forward, 

pupils are fixed and do not respond to light. Moist mucous membranes


NECK: Supple, trachea midline, no lymphadenopathy


CARDIOVASCULAR: Regular rate and rhythm, normal S1 and S2. 


RESPIRATORY: Breath sounds are difficult to auscultate likely due to body 

habitus and shallow breathing, no adventitious breath sounds appreciated


ABDOMEN: Obese, soft, nontender, nondistended, bowel sounds present.


EXTREMITIES: 2+ edema bilateral lower extremities.


NEUROLOGIC: GSC score of seven this morning, she withdraws from pain, makes 

incomprehensible sounds, opens eyes to pain.  Patient does not follow commands.





ASSESSMENT/PLAN:


81-year-old female with past medical history of hypothyroidism, diabetes, 

hypertension, and rheumatoid arthritis who initially presented to the emergency 

room after being found on the floor of her bathroom admitted to the hospital 

with hypothermia, hypotension, and hypoglycemia found to have E. coli UTI and 

staph lugdunensis bacteremia now on IV ceftriaxone.  Patient has also developed 

MICHELLE which has progressed to renal failure requiring dialysis.  Patient has also 

been diagnosed with bilateral cerebellar infarcts which may be subacute and is 

undergoing further work-up.  Concern for septic emboli raises concern for en

docarditis involved with the bacteremia.





1. Cardiovascular





#Atrial fibrillation with RVR, resolved, currently in sinus rhythm


Presumably new onset on admission, patient status post IV digoxin


We will avoid anticoagulation given her thrombocytopenia





#Rhabdomyolysis, resolved


Elevated CK on admission which has trended down with IV fluids





#Elevated troponin on admission


No ischemic changes on EKG at admission


Initially trended up and repeat EKG did not show any ischemic changes


Troponin has now stabilized, etiology is most likely demand ischemia





2. Pulmonary





#Tachypnea, resolved


- Most likely caused by compensatory response to metabolic acidosis from lactic 

acidosis. Patient's respiratory rate was in the high 10s today which is 

improvement from yesterday when it was in the high 20s. 


- CXR was negative for any lung pathology, although does show some early signs 

of fluid congestion likely from IV fluid administration.


The patient's breathing today appears normal and she is saturating well on room

air





3. GI/Nutrition





#Elevated liver enzymes, improved


Likely related to prolonged hypotension


Trend labs daily





#Nutrition


Will insert NG tube today to start on tube feeding


We will start on Nepro and titrate up to 60 mL/h





4. ID





#Septic shock secondary to staph lugdunensis bacteremia


Initially hypotensive which has stabilized, patient received over 8 L of fluids

and is now requiring fluid removal via dialysis


Lactic acidosis has trended down


PICC line has been placed for better peripheral access


Blood cultures x2 positive, repeat blood cultures x2 positive again, third set 

of blood cultures pending


Patient also had urine culture positive for E. coli


Continue antibiotic coverage with ceftriaxone day #4 (status post 

Zosyn-->ampicillin and vancomycin)


Given there is some concern that she could have septic emboli, we have ordered 

MRI of the cervical, thoracic, and lumbar spines.  We would also consider 

further evaluation of the heart with JENNIFER if she is stable enough to tolerate 

this





#UTI


Urine culture positive for E. coli


Antibiotic coverage as above





#Possible endocarditis


Patient is on appropriate antibiotic coverage for her bacteremia.  Her TTE was 

of poor technical quality.  We may consider JENNIFER to rule out endocarditis, 

especially given the new diagnosis of cerebellar infarction





#Possible meningitis


Given her presentation with bacteremia, nuchal rigidity and altered mental 

status, meningitis was elicited as a possible etiology


Currently she is not a good candidate for an LP.  We will continue antibiotic 

coverage as above


Given her altered mental status has not improved much with antibiotic coverage 

and she has now been shown to have strokes, there are more likely differentials 

to explain the patient's current altered mental status





5. Renal





#Acute renal failure with oliguria


Nephrology consulted, appreciate recommendations and management of dialysis


Patient has been receiving CRRT at bedside.  Plan per nephrology is to stop 

this this evening and consider HD moving forward if her renal function does not 

improve


Patient continues to have poor urine output, although this could be contributed

to by the CRRT





6. Neurologic





#Subacute versus chronic bilateral cerebellar infarcts


Initial CT head on admission was negative but has significant motion artifacts


Repeat head CT ordered today due to persistent altered mental status despite 

treatment of infection


This showed bilateral cerebellar infarcts which are new at least from 2019


Patient started on atorvastatin 80 mg daily via NG tube


We will hold off on starting aspirin or Plavix at this time given her 

thrombocytopenia


Ordered MRI brain, MRA brain, carotid artery ultrasound for further work-up.


Patient has already had TTE and has been on telemetry for rhythm monitoring.  

We do know she had had some atrial fibrillation on admission which could 

contribute to the strokes.  Given her TTE was of poor technical quality may 

consider JENNIFER for further evaluation for source of septic emboli


Neurology/Dr. Xavier consulted, appreciate recommendations





7. Heme





#Thrombocytopenia


Most likely related to the patient's sepsis.  At this point her platelet count 

seems to have stabilized.  We will continue to monitor


Consider transfusion for platelets less than 10,000 or if she starts bleeding 

with platelets less than 50,000.





8. Endocrine





#Hypothyroidism


Currently receiving IV levothyroxine for replacement at 50% of her home oral do

se


TSH elevated on admission, likely sick euthyroid syndrome





#Hypoglycemia on admission, resolved


Monitor FSBS every 6 hours


Hypoglycemic protocol





DVT prophylaxis: Teds and SCDs, hold on medical prophylaxis due to 

thrombocytopenia





CODE STATUS: DNR/DNI





Disposition: Pending clinical improvement, overall prognosis remains guarded.  

We had a meeting today with the patient's son and daughter again to discuss the 

patient's condition and the plan moving forward.  The family would like continue

with treatment and testing regarding the conditions discussed above.  They 

expressed understanding of the critical state of her health.





VS,Fishbone, I+O


VS, Fishbone, I+O


Laboratory Tests


8/6/21 17:14








8/7/21 00:01








8/7/21 05:07











Vital Signs








  Date Time  Temp Pulse Resp B/P (MAP) Pulse Ox O2 Delivery O2 Flow Rate FiO2


 


8/7/21 14:30 96.4       


 


8/7/21 14:00  83 21 128/60 (82) 97 Room Air  














I&O- Last 24 Hours up to 6 AM 


 


 8/7/21





 06:00


 


Intake Total 1947 ml


 


Output Total 2544 ml


 


Balance -597 ml











GME ATTESTATION


GME ATTESTATION


My faculty preceptor for this patient encounter was physically present during 

the encounter and was fully available. All aspects of the patient interview, 

examination, medical decision making process, and medical care plan development 

were reviewed and approved by the faculty preceptor. The faculty preceptor is 

aware and concurs with the plan as stated in the body of this note and will 

attest to such by his/her cosignature.





ATTENDING NOTE


I personally examined the patient, discussed her clinical progression, findings 

on labs and imaging and management with the resident team. I agree with the 

above summary and outlined medical management. We spoke with her family about 

the new development of the findings of the cerebellar infarcts and how this 

changes her prognosis quite drastically now beyond treatment of the infection as

well as the ongoing renal failure. In brief, Ms. Carlisle continues to be 

unresponsive, has a fixed left gaze, moans with presumed discomfort and has 

movements that do not appear purposeful. She is also anuric at this time and 

plan per nephrology is to end CRRT this evening as which point they trial 

diuretics if stable. Given the new stroke findings, we have consulted Dr. Xavier 

who recommended stat MRI/MRA that is pending. We will also try to get MRI of the

total spine for seeding studies at the same time if tolerated. For now, the 

family would like to continue with full treatment and we will be in close 

contact with more discussions as we find out more about extent of stroke.











GUSTABO GARCIA D.O.         Aug 7, 2021 16:30


SHELLY CHOE MD    Aug 8, 2021 10:09

## 2021-08-07 NOTE — IPNPDOC
Date Seen


The patient was seen on 8/7/21.





Progress Note


Called by Vrad to inform me of MRI finding from 8/7/21, showing ~50+ embolic 

infarcts involving the bilateral anterior and posterior circulation. This is 

consistent with ongoing management of IE. I discussed findings with Dr. Xavier, 

agrees no further intervention at this time. ASA will not change outcome, and 

starting anticoagulation is contraindicated in light of thrombocytopenia. Agrees

with poor prognosis. I spoke to the patient's children Mao and Page at 

bedside regarding grave prognosis, in view of her sepsis and innumerable 

ischemic infarctions, along with renal failure requireing CRRT (which is now 

held). While Mao agrees with comfort measures, Page is requesting ongoing 

medical management including IV antibiotics and CRRT. At this time we will 

continue with active medical management, including tube feedings.





VS, I&O, 24H, Fishbone


Vital Signs/I&O





Vital Signs








  Date Time  Temp Pulse Resp B/P (MAP) Pulse Ox O2 Delivery O2 Flow Rate FiO2


 


8/7/21 22:46   16     


 


8/7/21 22:00     100 Room Air  


 


8/7/21 20:00 97.0 89  157/56 (89)    














I&O- Last 24 Hours up to 6 AM 


 


 8/7/21





 06:00


 


Intake Total 1947 ml


 


Output Total 2544 ml


 


Balance -597 ml











Laboratory Data


24H LABS


Laboratory Tests 2


8/7/21 00:01: 


Nucleated Red Blood Cells % (auto) 0.2H, Immature Platelet Fraction 6.1, Anion 

Gap 4L, Glomerular Filtration Rate 54.3, Calcium Level 7.7L, Whole Blood Ionized

Calcium 4.4L, Phosphorus Level 3.2#, Magnesium Level 2.1


8/7/21 05:07: 


Nucleated Red Blood Cells % (auto) 0.2H, Anion Gap 5L, Glomerular Filtration 

Rate 56.1, Calcium Level 8.2L, Whole Blood Ionized Calcium 4.6, Phosphorus Level

2.8, Magnesium Level 2.0, Immature Granulocyte % (Auto) , Neutrophils (%) (Auto)

, Neutrophils 78H, Lymphocytes (Manual) 15L, Monocytes (Manual) 3, Eosinophils 

(Manual) 3, Basophils (Manual) 1, Polychromasia 1+, Anisocytosis 1+, Smudge 

Cells 1+, Platelet Estimate MARKED DECREASE, Total Bilirubin 1.8H, Aspartate 

Amino Transf (AST/SGOT) 73H, Alanine Aminotransferase (ALT/SGPT) 49, Alkaline 

Phosphatase 79, Total Protein 6.3L, Albumin 1.6L, Albumin/Globulin Ratio 0.3L


8/7/21 12:51: Bedside Glucose (Misc Panel) 83


8/7/21 19:17: Bedside Glucose (Misc Panel) 78L


CBC/BMP


Laboratory Tests


8/7/21 00:01








8/7/21 05:07








Microbiology





Microbiology


8/6/21 Blood Culture - Preliminary, Resulted


         No growth after 24 hours . All specim...


8/6/21 Blood Culture - Preliminary, Resulted


         No growth after 24 hours . All specim...


8/5/21 Blood Culture - Preliminary, Resulted


         


8/5/21 Blood Culture - Preliminary, Resulted


         


8/4/21 Blood Culture - Final, Complete


         Staphylococcus Lugdunensis


8/3/21 Urine Culture - Final, Complete


         Escherichia Coli


8/3/21 Blood Culture - Final, Complete


         Staphylococcus Lugdunensis


8/3/21 Blood Culture - Final, Complete


         Staphylococcus Lugdunensis











KVNG HOUGH MD        Aug 7, 2021 23:34

## 2021-08-07 NOTE — REPVR
PROCEDURE INFORMATION: 

Exam: MRA Head Without Contrast; Arteriography 

Exam date and time: 8/7/2021 6:56 PM 

Age: 81 years old 

Clinical indication: Cognitive deficit and other: Found unresponsive; Altered 

mental status; Additional info: Bilat cerebellar infarcts 



TECHNIQUE: 

Imaging protocol: Magnetic resonance angiography head without contrast. Exam 

focused on the arteries. 



COMPARISON: 

CT Head without contrast 8/7/2021 12:05 PM 



FINDINGS: 

 Examination is markedly motion limited. No gross occlusion involving the 

vertebral arteries or basilar artery. No occlusion involving the internal 

carotid arteries. No gross proximal occlusion involving the middle cerebral or 

anterior cerebral arteries. Stenosis is not excluded. Aneurysm cannot be 

reliably excluded. 



IMPRESSION: 

1. Examination is markedly motion limited. 

2. No gross proximal large vessel occlusion to the limited degree visualized. 



Electronically signed by: Kashif Bradley On 08/07/2021  19:06:01 PM

## 2021-08-08 VITALS — OXYGEN SATURATION: 99 %

## 2021-08-08 VITALS — SYSTOLIC BLOOD PRESSURE: 96 MMHG | DIASTOLIC BLOOD PRESSURE: 47 MMHG | OXYGEN SATURATION: 98 %

## 2021-08-08 VITALS — OXYGEN SATURATION: 96 %

## 2021-08-08 VITALS — OXYGEN SATURATION: 97 %

## 2021-08-08 VITALS — SYSTOLIC BLOOD PRESSURE: 157 MMHG | DIASTOLIC BLOOD PRESSURE: 56 MMHG | OXYGEN SATURATION: 99 %

## 2021-08-08 LAB
ALBUMIN SERPL BCG-MCNC: 2 GM/DL (ref 3.2–5.2)
ALT SERPL W P-5'-P-CCNC: 41 U/L (ref 12–78)
ANISOCYTOSIS BLD QL SMEAR: (no result)
BILIRUB SERPL-MCNC: 2.4 MG/DL (ref 0.2–1)
BUN SERPL-MCNC: 22 MG/DL (ref 7–18)
CALCIUM SERPL-MCNC: 7.8 MG/DL (ref 8.8–10.2)
CHLORIDE SERPL-SCNC: 108 MEQ/L (ref 98–107)
CO2 SERPL-SCNC: 22 MEQ/L (ref 21–32)
CREAT SERPL-MCNC: 1.58 MG/DL (ref 0.55–1.3)
EOSINOPHIL NFR BLD MANUAL: 1 % (ref 0–3)
GFR SERPL CREATININE-BSD FRML MDRD: 33.5 ML/MIN/{1.73_M2} (ref 32–?)
GLUCOSE SERPL-MCNC: 100 MG/DL (ref 70–100)
HCT VFR BLD AUTO: 30.2 % (ref 36–47)
HGB BLD-MCNC: 9.6 G/DL (ref 12–15.5)
LYMPHOCYTES NFR BLD MANUAL: 6 % (ref 16–44)
MAGNESIUM SERPL-MCNC: 2.2 MG/DL (ref 1.8–2.4)
MCH RBC QN AUTO: 30.4 PG (ref 27–33)
MCHC RBC AUTO-ENTMCNC: 31.8 G/DL (ref 32–36.5)
MCV RBC AUTO: 95.6 FL (ref 80–96)
METAMYELOCYTES NFR BLD MANUAL: 1 % (ref 0–0)
MONOCYTES NFR BLD MANUAL: 1 % (ref 0–5)
NEUTROPHILS NFR BLD MANUAL: 88 % (ref 28–66)
OVALOCYTES BLD QL SMEAR: (no result)
PLATELET # BLD AUTO: 31 10^3/UL (ref 150–450)
PLATELET BLD QL SMEAR: (no result)
POLYCHROMASIA BLD QL SMEAR: (no result)
POTASSIUM SERPL-SCNC: 4.8 MEQ/L (ref 3.5–5.1)
PROT SERPL-MCNC: 6 GM/DL (ref 6.4–8.2)
RBC # BLD AUTO: 3.16 10^6/UL (ref 4–5.4)
SCHISTOCYTES BLD QL SMEAR: (no result)
SODIUM SERPL-SCNC: 138 MEQ/L (ref 136–145)
WBC # BLD AUTO: 20.4 10^3/UL (ref 4–10)

## 2021-08-08 RX ADMIN — ATORVASTATIN CALCIUM SCH MG: 20 TABLET, FILM COATED ORAL at 08:51

## 2021-08-08 RX ADMIN — MORPHINE SULFATE PRN MG: 2 INJECTION, SOLUTION INTRAMUSCULAR; INTRAVENOUS at 12:17

## 2021-08-08 RX ADMIN — DOCUSATE SODIUM SCH MG: 100 CAPSULE, LIQUID FILLED ORAL at 07:49

## 2021-08-08 RX ADMIN — INSULIN LISPRO SCH UNITS: 100 INJECTION, SOLUTION INTRAVENOUS; SUBCUTANEOUS at 11:57

## 2021-08-08 RX ADMIN — Medication SCH UNITS: at 05:06

## 2021-08-08 RX ADMIN — SODIUM CHLORIDE SCH ML: 9 INJECTION, SOLUTION INTRAMUSCULAR; INTRAVENOUS; SUBCUTANEOUS at 05:06

## 2021-08-08 RX ADMIN — MORPHINE SULFATE PRN MG: 2 INJECTION, SOLUTION INTRAMUSCULAR; INTRAVENOUS at 09:40

## 2021-08-08 RX ADMIN — LEVOTHYROXINE SODIUM ANHYDROUS SCH MCG: 100 INJECTION, POWDER, LYOPHILIZED, FOR SOLUTION INTRAVENOUS at 05:06

## 2021-08-08 RX ADMIN — INSULIN LISPRO SCH UNITS: 100 INJECTION, SOLUTION INTRAVENOUS; SUBCUTANEOUS at 05:04

## 2021-08-08 RX ADMIN — INSULIN LISPRO SCH UNITS: 100 INJECTION, SOLUTION INTRAVENOUS; SUBCUTANEOUS at 00:00

## 2021-08-08 NOTE — IPNPDOC
Text Note


Date of Service


The patient was seen on 8/8/21.





NOTE


INTERIM EVENTS:


-MRI brain showed numerous >50 multihemispheric infarcts c/w showering of emboli

likely from a cardiac origin septic vs. embolic thrombi. 


-Patient was updated and as of last night had wanted to continue with medical 

management


-Neurology was consulted yesterday AM and recommended stat MRI/MRA brain that 

showed the infarcts as noted above, and in light of such severe findings and 

thrombocytopenia, recommended GOC without ASA or anticoagulation.


-Had NGT placed yesterday and had been placed on tube feeds.


-CRRT was stopped yesterday and she remains anuric


-This morning I had a lengthy discussion with nursing and with family about her 

very poor prognosis and both son and daughter at this time have come to a 

consensus that they would like to make her CMO. I am therefore transitioning her

to CMO and de-escalating her care accordingly and will transfer her to the 

med/surg floor. MOLST was updated and witnessed by nursing and is in chart.





SUBJECTIVE:


-She remains lethargic and is responsive only to painful stimuli and has 

episodic moaning.





OBJECTIVE:


VITAL SIGNS: See below. BP is now borderline low. She is in Afib with normal 

rate.


GENERAL:Critically ill appearing, unresponsive to verbal stimuli, responds to 

painful stimuli with episodic moaning.


HEENT: Normocephalic, atraumatic. Continues to have left gazing, not tracking.  

Pupils are fixed and do not respond to light. Dry MM.


NECK: Supple, trachea midline, no lymphadenopathy, has JVD to tragus of ear.


CARDIOVASCULAR: Irregularly irregular, no noted murmurs, distant sounds.


RESPIRATORY: Shallow breathing, no adventitious breath sounds with episodic 

transmitted upper airway sounds.


ABDOMEN: Obese, soft, nontender, nondistended, hypoactive bowel sounds 


EXTREMITIES: 2+ edema bilateral lower extremities, 2+ edema in upper extremities

as well.


NEUROLOGIC: Continues to withdraw from pain, makes incomprehensible sounds, 

opens eyes to pain with fixed left gaze and pupils that are unresponsive to 

light.  Not following commands.





Labs Reviewed:


WBC now 20.4


platelets 31


Cr 1.58


 


ASSESSMENT:


81-year-old W with a medical history significant for hypothyroidism, diabetes, 

hypertension, and rheumatoid arthritis who initially presented to the emergency 

room after being found on the floor of her bathroom for possibly >24h and 

admitted to ICU with septic shock with hypothermia, hypotension, and 

hypoglycemia found to have E. coli UTI and staph lugdunensis bacteremia s/p 

aggressive IVF and IV ceftriaxone with course c/b anuric MICHELLE s/p CRRT and 

multihemisphreric numerous non-hemorrhagic infarcts c/w with embolic stroke 

possibly 2/2 cardiac thrombi i/s/o Afib vs. septic emboli, with multifactorial 

encephalopathy i/s/o septic shock, extensive numerous CVA and renal failure who 

is now being transitioned to CMO. 





1. Cardiovascular





#Atrial fibrillation with RVR: currently in Afib with a normal rate.


Presumably new onset on admission, patient status post IV digoxin in ED. 


No anticoagulation was started given her presenting severe thrombocytopenia


-had been on telemetry but now being transitioned to CMO


-Initial TTE showed normal EF and no significant valvular pathology was noted at

the time





#Rhabdomyolysis


Elevated CK on admission which trended down with IV fluids





#Elevated troponin on admission


No ischemic changes on EKG with repeats also stable, however was in Afib, i

nitial TTE did not show WMA or thrombus.


Initially trended then stabilized, etiology at the time was deemed 2/2 demand 

ischemia





#Multihemispheric embolic CVAs


-Read as subacute, initial CT did not note them. i/s/o AFib without 

anticoagulation and also septic shock with persistent bacteremia possibly septic

emboli


-Neurology was consulted and appreciate recs, no ASA without much utility at 

this time and no anticoagulation with ongoing thrombocytopenia and CVA


-Now transitioned to CMO


Initial CT head on admission was negative but had significant motion artifacts


Repeat head CT showed bilateral cerebellar infarcts which are new at least from

2019


MRI brain showed multihemispheric non-hemorrhagic infarcts





Encephalopathy: multifactorial


-Metabolic i/s/o septic shock as well as multihemispheric numerous embolic non-

hemorrhagic strokes





#Tachypnea, resolved


- Most likely caused by compensatory response to metabolic acidosis from lactic 

acidosis. 


- CXR was negative for any lung pathology, although with some signs of fluid 

congestion likely from aggressive IV fluid administration.





#Transaminitis, improved


Likely related to prolonged hypotension





#Nutrition: had placed NGT and started tube feeds but will dc at this time as 

she is transitioned to CMO.





#Septic shock secondary to staph lugdunensis bacteremia and concurrent E.coli 

UTI


Initially hypotensive which resolved, s/p > 8 L of fluids 


Lactic acidosis resolved


Has persistent bacteremia since admission which almost confirmed seeding 

without source control. 


Will now discontinue antibiotic coverage as she is transitioned to CMO. S/p 

ceftriaxone 5d (status post Zosyn, ampicillin and vancomycin)


will abort the plan for MRI of the cervical, thoracic, and lumbar spine as well

as JENNIFER at this time





#UTI


Urine culture positive for E. coli. DC'd ceftriaxone today as she was 

transitioned to CMO.





#Likely endocarditis


-Initial TTE was of poor technical quality.  


-had considered JENNIFER but plan now aborted as she is transitioned to CMO


-DC'd abx as noted above.





#Anuric acute renal failure 


Nephrology consulted, appreciate recommendations 


s/p CRRT 








#Thrombocytopenia


Most likely related to the patient's sepsis as well as consumptive process with

possible LV thrombus given embolic CVAs if not septic emboli








#Hypothyroidism


will dc synthroid at this time.





# hypoglycemia at presentation: resolved





CODE STATUS: now CMO





VS,Fishbone, I+O


VS, Fishbone, I+O


Laboratory Tests


8/8/21 05:14











Vital Signs








  Date Time  Temp Pulse Resp B/P (MAP) Pulse Ox O2 Delivery O2 Flow Rate FiO2


 


8/8/21 06:00     96 Room Air 2.0 


 


8/8/21 04:00 98.6 95 20 96/47 (63)    














I&O- Last 24 Hours up to 6 AM 


 


 8/8/21





 06:00


 


Intake Total 1087.1 ml


 


Output Total 954 ml


 


Balance 133.1 ml

















SHELLY CHOE MD    Aug 8, 2021 11:46

## 2021-08-08 NOTE — DS.PDOC
Discharge Summary


General


Date of Admission


Aug 3, 2021 at 20:23


Date of Discharge


2021


Attending Physician:  SHELLY CHOE MD





Discharge Summary


PROCEDURES PERFORMED DURING STAY: 


1. Temporary dialysis catheter placement


2. PICC Line placement





ADMITTING DIAGNOSES: 


Septic shock


Encephalopathy


New onset Afib


MICHELLE


Rhabdomyolysis





DISCHARGE DIAGNOSES:


Cardiac arrest 2/2 respiratory arrest i/s/o brainstem infarction i/s/o extensive

embolic strokes, and possibly cardiovascular collapse i/s/o presumed chamber 

thrombi and MAHA. 


Septic shock 2/2 Staph lugdunesis bacteremia c/b presumed septic emboli and 

presumed infective endocarditis


E.coli UTI


New onset Afib c/b presumed LV thrombus c/b too numerous to count bilateral 

ischemic strokes affecting cerebellum, fifi, mid-brain, frontal, parietal, 

occipital lobes and deep cerebral white


matter.


Suspected septic emboli


Suspected infective endocarditis


Too numerous to count bilateral ischemic strokes affecting cerebellum, fifi, 

mid-brain, frontal, parietal, occipital lobes and deep cerebral white matter.


Thrombocytopenia


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


Anuric acute renal failure


Rhabdomyolysis


Hypoglycemia


Hypocalcemia


Type 2 NSTEMI


DM


Obesity


Metabolic encephalopathy





COMPLICATIONS/CHIEF COMPLAINT: Elevated Troponin,New Onset Afib,Septic Shock,.





HISTORY OF PRESENT ILLNESS:


History was obtained from emergency department staff as patient did not remember

how she ended up in the hospital. The admitting physician noted that he was told

that she was found on the floor by her neighbors suspected to have been on the 

ground for over 24 hours and EMS was called and she was brought to the ED. 





HOSPITAL COURSE


On arrival she was hypothermic 91 Fahrenheit and hypoglycemic blood sugar was 

31. She was hypotensive and had a lactic acidosis of 4.1, while her troponin was

elevated to 3.8 and the ED contacted Dr. Jones (cardiologist) and discussed the

case and he suspected this was demand ischemia from acute illness and repeat 

troponin had gone down to 3.62. EKG was otherwise with ST segment changes but 

showed Afib which was presumed new onset at the time. She was not started on 

anticoagulation because in addition to a significant leukocytosis to 16.3, she 

was also thrombocytopenic with a platelet count of 72. Her urinalysis was 

positive and was suspected to have sepsis secondary to this urinary tract 

infection at that time. She was started on IV fluids with transient mentation 

improvement and she was awake enough to answer some basic questions and was 

oriented to herself, a hospital but had no idea what happened and how she ended 

up here and confirmed that she did not want resuscitation or intubation. She was

too ill to go over her entire past medical history but was able verbalize that 

she is a diabetic.





While in the ED, she had BCx, UA and reflex UCx sent and was started on empiric 

vancomycin and pip/tazo. She had a CXR that was grossly unremarkable without 

evidence of infiltrate or effusions and CT head that had significant motion 

defect without noted acute pathology or bleeding while a CT of the C-spine did 

not show an subluxation or fractures. She had persistent hypotension and was 

given aggressive IV fluids and there was a trial to place a TLC but she was 

dehydrated with a very collapsible IJ such that they deferred and decided to con

tinue aggressive hydration and she received a total of >8L of NS with eventual 

response. She was admitted to the ICU for septic shock. On day 2 of admission, 

she was noted to have a decline in her mental status and her neck was very 

stiff. Given that she was previously healthy with very little contact with 

healthcare settings, I had very low suspicion of pseudomonas but was concerned 

about potential meningitis so I switched her to vanc/ceftriaxone/ampicillin. She

had a TTE that was a poor study but noted a normal EF and no comment about an 

obvious valvular vegetation or chamber thrombus. By day 3, BCx grew GPCs in 

clusters while urine grew E.coli and she was continued on vanc/ceftriaxone. In 

the meantime, she had an anuric MICHELLE and nephrology had been consulted and 

started her on CRRT. Her neck stiffness improved and her neurological exam 

changed from being obtunded to FROM of neck but with episodic moaning and 

grimacing with movement of extremities that were not purposeful and could not 

follow commands. She also had fixed left gaze and was not tracking. Now that her

shock had resolved and was off fluids and CRRT was able to remove some fluid and

could safely leave the ICU for imaging, given her neurological examination, she 

had a repeat CT head that revealed bilateral cerebellar non-hemorrhagic 

infarcts. Neurology was consulted and on review of her CT scan, suspected that 

she had a large stroke in left cerebellum


that may have been extending into her brainstem and recommended stat MRI scan of

brain which was done which showed large right cerebellar multiple pontine, 

mid-brain, left cerebellar, multiple frontal, occipital, parietal, deep cerebral

white matter strokes bilaterally which were too numerous to count. MRA, brain 

was affected by motion artifact. At this point there was high suspicion of 

embolic phenomena secondary to likely cardiac thrombi, likely LV thrombus, i/s/o

likely DICvs. TTP precipitated by the septic shock, suspected septic emboli as 

she also had persistent bacteremia despite appropriate antibiotic therapy for 

days and resolution of initial shock and was in Afib without anticoagulation 

onboard. After much discussion with family, she was eventually made CMO on the 

morning of 2021 and antibiotic therapy was stopped, CRRT had been discontin

ued on  and she  at 3.22 PM on  likely 2/2 brain stem infarction 

i/s/o extensive embolic strokes w/ ongoing embolic phenomenon and possibly 

cardiovascular collapse 2/2 to significant cardiac chamber thombi and possible 

interrupted vascular supply i/s/o MAHA. 





DISCHARGE MEDICATIONS: Please see below.


 


ALLERGIES: Please see below.





PHYSICAL EXAMINATION ON DISCHARGE: 





LABORATORY DATA: Please see below.





IMAGIN/3 CT C-spine: No evidence of acute fracture or dislocation.Degenerative 

changes as above.





8/3 CXR: Interstitial coarsening, somewhat increased from the comparison study.





8/3 CT head: Mild stable chronic findings.  No acute intracranial abnormality.





8/3 L shoulder XR: 


There is no evidence of acute fracture, dislocation, or intrinsic bone 

disease.Humeral


head is high riding with significant decreased subacromial space compatible with

a


rotator cuff tear.  There are mild degenerative changes at the acromioclavicular

joint.





8/3 CXR:


Lungs: Ovoid density projects over the mid heart region may suggest presence of 


a granuloma. Additional granulomas in the left mid lung zone. Increased 


interstitial lung markings demonstrated bilaterally. No acute infiltrates. 


Prominent contour of the right pulmonary hilum may be related to a prominent 


vascular structure although a perihilar infiltrate or mass not excluded. 


Further evaluation with nonemergent thoracic CT may be of additional benefit. 


Pleural spaces: Unremarkable. No pleural effusion. No pneumothorax. 


Heart/Mediastinum: Cardiomegaly. 


Bones/joints: Osteoporosis. The spine demonstrates moderate degenerative 


changes. 





8/3 AXR:


Extremely limited examination because of patient body habitus.


The gallbladder, pancreas, aorta and left kidney could not be visualized.


The common biliary duct is dilated in a patient without cholecystectomy but 

normal


size in a post cholecystectomy patient.


The spleen is mildly enlarged.


No free fluid is identified.





8/: 


Diffuse bilateral interstitial coarsening that has increased.


No focal infiltrate or pleural effusion.


Questionable new lung nodule projected above the left hemidiaphragm, over the 

heart,


to the left of the spine as discussed above.





8/ Renal US:


The study is extremely limited because of patient body habitus, edema and 

performed


portably.  Additionally, the patient is not alert and unable to suspend 

respirations


or roll and turn and required.





The right kidney measures 10.9 x 4.7 x 5.7 cm.


The left kidney measures 9.4 x 4.7 x 5.7 cm.


The kidneys are in the low normal size range.


Renal cortical echogenicity is normal bilaterally.


Mild hydronephrosis is suspected on the right..


The left kidney is extremely difficult to visualize.  No definite hydronephrosis

is


identified on the left.


A 6 mm calculus, nonobstructive, is identified in the left renal upper pole.


No definite renal masses or cysts are identified, however, the kidneys are 

poorly


visualized.


Bladder:


The bladder could not be visualized.





IMPRESSION:


Very limited study as discussed above.  Question mild hydronephrosis on the 

right.


Small left renal upper pole nonobstructive calculus.





 Head CT:


Brain: Small wedge-shaped areas of hypoattenuation within the bilateral 


cerebellar hemispheres, superiorly on the left and inferiorly on the right. 


Small band of hypoattenuation in the right frontal white matter. There is 


otherwise mild generalized cerebral volume loss and mild patchy white matter 


hypoattenuation, commonly secondary to chronic small vessel ischemic change. 


Mild intracranial atherosclerosis. No acute ischemic infarction. No acute 


intracranial hemorrhage. 


Cerebral ventricles: No ventriculomegaly. 


Paranasal sinuses: Visualized sinuses are unremarkable. No fluid levels. 


Mastoid air cells: Visualized mastoid air cells are well aerated. 


Bones/joints: No acute fracture. 


Soft tissues: Unremarkable. 





IMPRESSION: 


Small bilateral cerebellar infarctions, which appear either subacute or chronic 


although are new from the 10/24/2019 CT comparison. MRI could be considered for 


further evaluation. 





 MRI brain:


Examination is markedly motion limited. Age-related volume loss. Major 


vascular flow voids at the skull base are grossly preserved. No gross 


extra-axial fluid collection. 


 Nonspecific white matter gliosis, probable chronic microvascular ischemia. 


There are interval foci of diffusion restriction involving the bilateral 


cerebellar hemispheres, brainstem and bilateral cerebral hemispheres. Dominant 


focus at the right cerebellum measures up to 2.6 cm. There is associated T2 


prolongation. 


 Mild paranasal sinus disease. Small to moderate bilateral mastoid effusions. 





IMPRESSION: 


1. Examination is markedly motion limited. 


2. There are numerous likely early subacute ischemic infarctions involving the 


bilateral anterior and posterior circulation. Consider cardiogenic emboli. 








 MRA brain:


Examination is markedly motion limited. No gross occlusion involving the 


vertebral arteries or basilar artery. No occlusion involving the internal 


carotid arteries. No gross proximal occlusion involving the middle cerebral or 


anterior cerebral arteries. Stenosis is not excluded. Aneurysm cannot be 


reliably excluded. 





IMPRESSION: 


1. Examination is markedly motion limited. 


2. No gross proximal large vessel occlusion to the limited degree visualized. 





 CXR:


The lung apices are excluded at the upper film margin.


There has been interval placement of a nasogastric tube.  The nasogastric tube 

tip is


in satisfactory location in the abdominal left upper quadrant.


Diffuse bilateral interstitial coarsening is again noted, not significantly 

changed.


Cardiac size is normal.








IMPRESSION:


The NG tube tip is in the abdominal  left upper quadrant in satisfactory 

position.


There is diffuse bilateral interstitial coarsening, unchanged.


The lung apices are excluded at the superior film margin.





PROGNOSIS: 





ACTIVITY: 





DIET: 





DISCHARGE PLAN: 





DISPOSITION: 





DISCHARGE INSTRUCTIONS:








ITEMS TO FOLLOWUP ON ON OUTPATIENT:








DISCHARGE CONDITION: 





TIME SPENT ON DISCHARGE: 78 minutes.





Vital Signs/I&Os





Vital Signs








  Date Time  Temp Pulse Resp B/P (MAP) Pulse Ox O2 Delivery O2 Flow Rate FiO2


 


21 06:00     96 Room Air 2.0 


 


21 04:00 98.6 95 20 96/47 (63)    














I&O- Last 24 Hours up to 6 AM 


 


 21





 06:00


 


Intake Total 1087.1 ml


 


Output Total 954 ml


 


Balance 133.1 ml











Laboratory Data


Labs 24H


Laboratory Tests 2


21 19:17: Bedside Glucose (Misc Panel) 78L


21 00:02: Bedside Glucose (Misc Panel) 82L


21 05:04: Bedside Glucose (Misc Panel) 87


21 05:14: 


Neutrophils (%) (Auto) , Nucleated Red Blood Cells % (auto) 0.1H, Neutrophils 

88H, Band Neutrophils 3, Lymphocytes (Manual) 6L, Monocytes (Manual) 1, 

Eosinophils (Manual) 1, Metamyelocytes 1H, Polychromasia 1+, Anisocytosis 1+, 

Schistocytes 1+, Ovalocytes 1+, Platelet Estimate MARKED DECREASE, Immature 

Platelet Fraction 7.5, Anion Gap 8, Glomerular Filtration Rate 33.5, Calcium 

Level 7.8L, Magnesium Level 2.2, Total Bilirubin 2.4H, Aspartate Amino Transf 

(AST/SGOT) 57H, Alanine Aminotransferase (ALT/SGPT) 41, Alkaline Phosphatase 78,

Total Protein 6.0L, Albumin 2.0#L, Albumin/Globulin Ratio 0.5L


CBC/BMP


Laboratory Tests


21 05:14








FSBS





Laboratory Tests








Test


 21


19:17 21


00:02 21


05:04 Range/Units


 


 


Bedside Glucose (Misc Panel) 78 82 87   MG/DL











Microbiology





Microbiology


21 Blood Culture - Preliminary, Resulted


         


21 Blood Culture - Preliminary, Resulted


         


21 Blood Culture - Final, Complete


         Staphylococcus Lugdunensis


21 Blood Culture - Final, Complete


         Staphylococcus Lugdunensis


21 Blood Culture - Final, Complete


         Staphylococcus Lugdunensis


8/3/21 Urine Culture - Final, Complete


         Escherichia Coli


8/3/21 Blood Culture - Final, Complete


         Staphylococcus Lugdunensis


8/3/21 Blood Culture - Final, Complete


         Staphylococcus Lugdunensis





Discharge Medications


Scheduled


Adalimumab (Humira Pen) 40 Mg/0.8 Ml Pen.ij.kit, 40 MG PO Q2WK, (Reported)


Atenolol (Atenolol) 50 Mg Tablet, 25 MG PO DAILY, (Reported)


Cholecalciferol (Vitamin D3) (Vitamin D3) 25 Mcg Capsule, 25 MCG PO DAILY, 

(Reported)


Cyanocobalamin (Vitamin B-12) (Vitamin B-12) 500 Mcg Tablet, 500 MCG PO DAILY, 

(Reported)


Duloxetine Hcl (Duloxetine HCl) 30 Mg Capsule.dr, 90 MG PO QHS, (Reported)


Insulin Aspart (Novolog Flexpen) 100 Unit/1 Ml Insuln.pen, 12 UNITS SQ BID, 

(Reported)


Insulin Degludec (Tresiba Flextouch U-200) 200 Unit/1 Ml Insuln.pen, 80 UNITS SQ

DAILY, (Reported)


Irbesartan (Irbesartan) 150 Mg Tablet, 150 MG PO DAILY, (Reported)


Levothyroxine Sodium (Levoxyl) 125 Mcg Tablet, 125 MCG PO DAILY, (Reported)


Rosuvastatin Calcium (Rosuvastatin Calcium) 20 Mg Tablet, 20 MG PO DAILY, 

(Reported)


Sitagliptin (Januvia) 50 Mg Tablet, 50 MG PO DAILY, (Reported)





Miscellaneous Medications


[Patient Comment]  , (Reported)


   UNABLE TO VERIFY MEDICATIONS WITH PATIENT, MED LIST OBTAINED FROM [facility] 

[facility] 





Allergies


Coded Allergies:  


     No Known Drug Allergies (Verified  Allergy, Unknown, 21)











SHELLY CHOE MD    Aug 8, 2021 17:32